# Patient Record
Sex: FEMALE | Race: WHITE | NOT HISPANIC OR LATINO | Employment: OTHER | URBAN - METROPOLITAN AREA
[De-identification: names, ages, dates, MRNs, and addresses within clinical notes are randomized per-mention and may not be internally consistent; named-entity substitution may affect disease eponyms.]

---

## 2018-02-05 ENCOUNTER — OFFICE VISIT (OUTPATIENT)
Dept: FAMILY MEDICINE | Facility: CLINIC | Age: 78
End: 2018-02-05
Attending: FAMILY MEDICINE
Payer: COMMERCIAL

## 2018-02-05 VITALS
DIASTOLIC BLOOD PRESSURE: 76 MMHG | SYSTOLIC BLOOD PRESSURE: 116 MMHG | HEIGHT: 61 IN | WEIGHT: 182 LBS | BODY MASS INDEX: 34.36 KG/M2 | HEART RATE: 85 BPM

## 2018-02-05 DIAGNOSIS — K30 PERSISTENT INDIGESTION: Primary | ICD-10-CM

## 2018-02-05 LAB
ALBUMIN SERPL-MCNC: 3.7 G/DL (ref 3.4–5)
ALP SERPL-CCNC: 69 U/L (ref 40–150)
ALT SERPL W P-5'-P-CCNC: 23 U/L (ref 0–50)
AMYLASE SERPL-CCNC: 31 U/L (ref 30–110)
AST SERPL W P-5'-P-CCNC: 17 U/L (ref 0–45)
BILIRUB DIRECT SERPL-MCNC: <0.1 MG/DL (ref 0–0.2)
BILIRUB SERPL-MCNC: 0.3 MG/DL (ref 0.2–1.3)
LIPASE SERPL-CCNC: 89 U/L (ref 73–393)
PROT SERPL-MCNC: 7.6 G/DL (ref 6.8–8.8)

## 2018-02-05 PROCEDURE — G0463 HOSPITAL OUTPT CLINIC VISIT: HCPCS | Mod: ZF

## 2018-02-05 PROCEDURE — 80076 HEPATIC FUNCTION PANEL: CPT | Performed by: FAMILY MEDICINE

## 2018-02-05 PROCEDURE — 36415 COLL VENOUS BLD VENIPUNCTURE: CPT | Performed by: FAMILY MEDICINE

## 2018-02-05 PROCEDURE — 83516 IMMUNOASSAY NONANTIBODY: CPT | Performed by: FAMILY MEDICINE

## 2018-02-05 PROCEDURE — 83690 ASSAY OF LIPASE: CPT | Performed by: FAMILY MEDICINE

## 2018-02-05 PROCEDURE — 82150 ASSAY OF AMYLASE: CPT | Performed by: FAMILY MEDICINE

## 2018-02-05 RX ORDER — OMEPRAZOLE 40 MG/1
40 CAPSULE, DELAYED RELEASE ORAL DAILY
Qty: 30 CAPSULE | Refills: 1 | Status: SHIPPED | OUTPATIENT
Start: 2018-02-05 | End: 2018-12-11

## 2018-02-05 ASSESSMENT — PAIN SCALES - GENERAL: PAINLEVEL: NO PAIN (0)

## 2018-02-05 NOTE — PATIENT INSTRUCTIONS
10 days of glutein free diet  Then 10 days of lactose free diet  Continue supplements that have probiotics  Last would try 2 wks of omeprazole   Return in 1 month  Blood work today

## 2018-02-05 NOTE — PROGRESS NOTES
[facility]  Established Patient visit    Name: Mirta Pino MRN: [id number]     Age: 77 year old           Chief Complaint:    YOB: 1940       CC: indigestion         HPI and ROS:   HPI:    77 year old female with past medical history of high cholesterol, multinodular goiter, reflux and arthritis who comes in today because of digestive problems.  For the last 2 weeks she has been getting sick after she eats.  This mainly was occurring in the evening, then she changed her main meal to midday and she still gets this feeling of sickness.  Comes about an hour after she has eaten.  She feels somewhat nauseous but does not vomit.  She also has lost her appetite.  Her stools have not changed they are regular, no blood no diarrhea.  She has lost  about 5 pounds.  She does have reflux and  has been prescribed ranitidine however she never took it.  She uses self treatment of a tablespoon of apple cider vinegar and water and this seems to settle her stomach. She does report heartburn.   She takes multiple supplements which are vitamins and includes probiotics.  She also describes no energy.  She has not tried a gluten-free diet nor lactose-free diet. She likes milk and cheese and eats considerable amounts.  She reports minimal alcohol intake with 5 drinks a month.  She does not take NSAIDs.  She is not a smoker.    ROS:  A 14-point Review of Systems was performed and is negative other than mentioned in HPI         Past Medical History:     Past Medical History:   Diagnosis Date     Colon polyps      NO ACTIVE PROBLEMS (aka NONE)      Pneumonia spring 2012     Reflux      Shingles     1 episode     Thyroid disease      Thyroid nodule     had biopsies  many yrs ago and benign - no meds             Past Surgical History:      Past Surgical History:   Procedure Laterality Date     APPENDECTOMY  1962    had jc on fallopian tube and was removed also     ARTHROPLASTY MINIMALLY INVASIVE HIP   2/27/2013    Procedure: ARTHROPLASTY MINIMALLY INVASIVE HIP;  Minimally Invasive Left Two Incision Total  Hip Arthroplasty;  Surgeon: Brennan Covarrubias MD;  Location: [facility]     C NONSPECIFIC PROCEDURE      ovarian cyst age 22     C NONSPECIFIC PROCEDURE      lipoma back 1992     CHOLECYSTECTOMY       CHOLECYSTECTOMY  2009     COLONOSCOPY  , 2008, 11/4/2013    due in 2018 Hx of precancerous polyps, +FHx maternal aunt     HRW TOTAL KNEE REPLACEMT, CRNA Right 7/9/2015     KNEE SURGERY  2003    right meniscus     SOFT TISSUE SURGERY       SOFT TISSUE SURGERY  1982    tumor on back - benign               Immunizations:     Immunization History   Administered Date(s) Administered     Pneumococcal 23 valent 06/05/2012     TDAP Vaccine (Boostrix) 06/05/2012     Zoster vaccine, live 06/05/2012             Allergies:     Allergies   Allergen Reactions     Gluten      Ibuprofen Sodium GI Disturbance     [facility]-Ginseng [Harmonex] Hives             Medications:     Current Outpatient Prescriptions on File Prior to Visit:  ALFALFA PO Take 10 mg by mouth Calcium 300 mg   Nutritional Supplements (NUTRITIONAL SUPPLEMENT PO) Take 4 capsules by mouth daily OsteomatrixCalcium carbonate, citrate, malate 1,000 mgVitamin D3 600 iu   Nutritional Supplements (NUTRITIONAL SUPPLEMENT PO) Take 2 tablets by mouth Cholesterol Reduction Complex   Nutritional Supplements (NUTRITIONAL SUPPLEMENT PO) Take 1 tablet by mouth daily Mindworks   Cholecalciferol (VITAMIN D) 1000 UNITS capsule Take 2 capsules by mouth daily   Nutritional Supplements (NUTRITIONAL SUPPLEMENT PO) Take 2 capsules by mouth Omegaguard   Nutritional Supplements (NUTRITIONAL SUPPLEMENT PO) Take 2 tablets by mouth daily NutriferonImmune system science   Nutritional Supplements (NUTRITIONAL SUPPLEMENT PO) Slow aging at cellular levelLiquid supplement   aspirin 81 MG tablet Take 1 tablet (81 mg) by mouth daily   Multiple Vitamin (MULTI-VITAMIN DAILY PO) Take 6 tablets by  "mouth daily. Prepackaged vitamin supplement    albuterol (PROAIR HFA, PROVENTIL HFA, VENTOLIN HFA) 108 (90 BASE) MCG/ACT inhaler Inhale 2 puffs into the lungs every 6 hours as needed for shortness of breath / dyspnea or wheezing     No current facility-administered medications on file prior to visit.          Exam:   /76  Pulse 85  Ht 1.549 m (5' 1\")  Wt 82.6 kg (182 lb)  BMI 34.39 kg/m2    General: NAD, alert and conversational  CV: Normal S1,S2 with RRR no murmurs, rubs or gallops  Resp: Lungs CTA bilaterally with no wheezes or crackles  Abd: hyperactive bowel sounds, no distension, Soft, NTND with no organomegaly or masses    Skin: No concerning lesions or rashes        Labs:   TTG  LFT's  Amylase, lipase          Assessment and Plan:     Assessment.  This is a 77-year-old woman who comes in with a two-week history of nausea after eating and loss of appetite.  Plan  1.  Nausea/loss of appetite.  This could be related to acid indigestion, she does have a hx of reflux.  Could be gluten intolerance.  Could be lactose intolerance.  Will check blood work for gluten intolerance, also for liver or pancreatic abnormalities.  We talked about starting with a gluten-free diet for 10 days if this does not help then go to a lactose-free diet for 10 days, if that does not help and I gave her a prescription for omeprazole 40 mg 1 daily for 2 weeks.  I wanted her to stop the supplements except for the probiotics but she was unwilling to do this so she will continue with her supplements and probiotics.  I will see her back in 1 month. Consider H pylori testing.             RTC: 1 month  Margie Haddad MD, PhD                 "

## 2018-02-05 NOTE — LETTER
2/5/2018       RE: Mirta Pino  [address] [facility] [address]     Dear Colleague,    Thank you for referring your patient, Mirta Pino, to the [facility] at [facility]. Please see a copy of my visit note below.              [facility] Center  Established Patient visit    Name: Mirta Pino MRN: [id number]     Age: 77 year old           Chief Complaint:    YOB: 1940       CC: indigestion         HPI and ROS:   HPI:    77 year old female with past medical history of high cholesterol, multinodular goiter, reflux and arthritis who comes in today because of digestive problems.  For the last 2 weeks she has been getting sick after she eats.  This mainly was occurring in the evening, then she changed her main meal to midday and she still gets this feeling of sickness.  Comes about an hour after she has eaten.  She feels somewhat nauseous but does not vomit.  She also has lost her appetite.  Her stools have not changed they are regular, no blood no diarrhea.  She has lost  about 5 pounds.  She does have reflux and  has been prescribed ranitidine however she never took it.  She uses self treatment of a tablespoon of apple cider vinegar and water and this seems to settle her stomach. She does report heartburn.   She takes multiple supplements which are vitamins and includes probiotics.  She also describes no energy.  She has not tried a gluten-free diet nor lactose-free diet. She likes milk and cheese and eats considerable amounts.  She reports minimal alcohol intake with 5 drinks a month.  She does not take NSAIDs.  She is not a smoker.    ROS:  A 14-point Review of Systems was performed and is negative other than mentioned in HPI         Past Medical History:     Past Medical History:   Diagnosis Date     Colon polyps      NO ACTIVE PROBLEMS (aka NONE)      Pneumonia spring 2012     Reflux      Shingles     1 episode     Thyroid  disease      Thyroid nodule     had biopsies  many yrs ago and benign - no meds             Past Surgical History:      Past Surgical History:   Procedure Laterality Date     APPENDECTOMY  1962    had jc on fallopian tube and was removed also     ARTHROPLASTY MINIMALLY INVASIVE HIP  2/27/2013    Procedure: ARTHROPLASTY MINIMALLY INVASIVE HIP;  Minimally Invasive Left Two Incision Total  Hip Arthroplasty;  Surgeon: Brennan Covarrubias MD;  Location: [facility]     C NONSPECIFIC PROCEDURE      ovarian cyst age 22     C NONSPECIFIC PROCEDURE      lipoma back 1992     CHOLECYSTECTOMY       CHOLECYSTECTOMY  2009     COLONOSCOPY  , 2008, 11/4/2013    due in 2018 Hx of precancerous polyps, +FHx maternal aunt     HRW TOTAL KNEE REPLACEMT, CRNA Right 7/9/2015     KNEE SURGERY  2003    right meniscus     SOFT TISSUE SURGERY       SOFT TISSUE SURGERY  1982    tumor on back - benign               Immunizations:     Immunization History   Administered Date(s) Administered     Pneumococcal 23 valent 06/05/2012     TDAP Vaccine (Boostrix) 06/05/2012     Zoster vaccine, live 06/05/2012             Allergies:     Allergies   Allergen Reactions     Gluten      Ibuprofen Sodium GI Disturbance     [facility]-Ginseng [Harmonex] Hives             Medications:     Current Outpatient Prescriptions on File Prior to Visit:  ALFALFA PO Take 10 mg by mouth Calcium 300 mg   Nutritional Supplements (NUTRITIONAL SUPPLEMENT PO) Take 4 capsules by mouth daily OsteomatrixCalcium carbonate, citrate, malate 1,000 mgVitamin D3 600 iu   Nutritional Supplements (NUTRITIONAL SUPPLEMENT PO) Take 2 tablets by mouth Cholesterol Reduction Complex   Nutritional Supplements (NUTRITIONAL SUPPLEMENT PO) Take 1 tablet by mouth daily Mindworks   Cholecalciferol (VITAMIN D) 1000 UNITS capsule Take 2 capsules by mouth daily   Nutritional Supplements (NUTRITIONAL SUPPLEMENT PO) Take 2 capsules by mouth Omegaguard   Nutritional Supplements (NUTRITIONAL SUPPLEMENT PO)  "Take 2 tablets by mouth daily NutriferonImmune system science   Nutritional Supplements (NUTRITIONAL SUPPLEMENT PO) Slow aging at cellular levelLiquid supplement   aspirin 81 MG tablet Take 1 tablet (81 mg) by mouth daily   Multiple Vitamin (MULTI-VITAMIN DAILY PO) Take 6 tablets by mouth daily. Prepackaged vitamin supplement    albuterol (PROAIR HFA, PROVENTIL HFA, VENTOLIN HFA) 108 (90 BASE) MCG/ACT inhaler Inhale 2 puffs into the lungs every 6 hours as needed for shortness of breath / dyspnea or wheezing     No current facility-administered medications on file prior to visit.          Exam:   /77  Pulse 85  Ht 1.549 m (5' 1\")  Wt 82.6 kg (182 lb)  BMI 34.39 kg/m2    General: NAD, alert and conversational  CV: Normal S1,S2 with RRR no murmurs, rubs or gallops  Resp: Lungs CTA bilaterally with no wheezes or crackles  Abd: hyperactive bowel sounds, no distension, Soft, NTND with no organomegaly or masses    Skin: No concerning lesions or rashes        Labs:   TTG  LFT's  Amylase, lipase          Assessment and Plan:     Assessment.  This is a 77-year-old woman who comes in with a two-week history of nausea after eating and loss of appetite.  Plan  1.  Nausea/loss of appetite.  This could be related to acid indigestion, she does have a hx of reflux.  Could be gluten intolerance.  Could be lactose intolerance.  Will check blood work for gluten intolerance, also for liver or pancreatic abnormalities.  We talked about starting with a gluten-free diet for 10 days if this does not help then go to a lactose-free diet for 10 days, if that does not help and I gave her a prescription for omeprazole 40 mg 1 daily for 2 weeks.  I wanted her to stop the supplements except for the probiotics but she was unwilling to do this so she will continue with her supplements and probiotics.  I will see her back in 1 month. Consider H pylori testing.             RTC: 1 month  Margie Haddad MD, PhD                   Again, thank " you for allowing me to participate in the care of your patient.      Sincerely,    Margie Haddad MD PhD

## 2018-02-05 NOTE — LETTER
2/6/2018         Mirta Pino   [address] [facility] [address]        Dear Ms. Pino:    The results of your recent test(s) listed below were normal. I would still try the gluten free diet as well as the other things we talked about    Results for orders placed or performed in visit on 02/05/18   Lipase   Result Value Ref Range    Lipase 89 73 - 393 U/L   Tissue transglutaminase braden IgA and IgG   Result Value Ref Range    Tissue Transglutaminase Antibody IgA <1 <7 U/mL    Tissue Transglutaminase Braden IgG <1 <7 U/mL   Amylase   Result Value Ref Range    Amylase 31 30 - 110 U/L   Hepatic Panel   Result Value Ref Range    Bilirubin Direct <0.1 0.0 - 0.2 mg/dL    Bilirubin Total 0.3 0.2 - 1.3 mg/dL    Albumin 3.7 3.4 - 5.0 g/dL    Protein Total 7.6 6.8 - 8.8 g/dL    Alkaline Phosphatase 69 40 - 150 U/L    ALT 23 0 - 50 U/L    AST 17 0 - 45 U/L         Please note that test explanations are brief and do not reflect all diagnostic uses.  If you have any questions or concerns, please call the clinic at [phone].      Sincerely,      Margie Haddad MD, PhD

## 2018-02-05 NOTE — NURSING NOTE
Chief Complaint   Patient presents with     Gastrointestinal Problem     Pt comes to clinic today to discuss digestive concerns.     B/P average reading 113/75

## 2018-02-05 NOTE — MR AVS SNAPSHOT
After Visit Summary   2/5/2018    Mirta Pino    MRN: [id number]           Patient Information     Date Of Birth          1940        Visit Information        Provider Department      2/5/2018 3:00 PM Margie Haddad MD PhD [facility]        Today's Diagnoses     Persistent indigestion    -  1      Care Instructions    10 days of glutein free diet  Then 10 days of lactose free diet  Continue supplements that have probiotics  Last would try 2 wks of omeprazole   Return in 1 month  Blood work today           Follow-ups after your visit        Future tests that were ordered for you today     Open Future Orders        Priority Expected Expires Ordered    Tissue transglutaminase braden IgA and IgG Routine  2/5/2019 2/5/2018    Amylase Routine  2/5/2019 2/5/2018    Hepatic Panel Routine  2/5/2019 2/5/2018            Who to contact     Please call your clinic at [phone] to:    Ask questions about your health    Make or cancel appointments    Discuss your medicines    Learn about your test results    Speak to your doctor   If you have compliments or concerns about an experience at your clinic, or if you wish to file a complaint, please contact [facility] Patient Relations at [phone] or email us at [email or web address]         Additional Information About Your Visit        MyChart Information     MediaLifTVt is an electronic gateway that provides easy, online access to your medical records. With 365webcall, you can request a clinic appointment, read your test results, renew a prescription or communicate with your care team.     To sign up for MediaLifTVt visit the website at [email or web address]   You will be asked to enter the access code listed below, as well as some personal information. Please follow the directions to create your username and password.     Your access code is: [email or web address]  Expires: 5/6/2018  4:04 PM     Your  "access code will  in 90 days. If you need help or a new code, please contact your [facility] or call [phone] for assistance.        Care EveryWhere ID     This is your Care EveryWhere ID. This could be used by other organizations to access your [place] medical records  [id number]        Your Vitals Were     Pulse Height BMI (Body Mass Index)             85 1.549 m (5' 1\") 34.39 kg/m2          Blood Pressure from Last 3 Encounters:   18 116/76   16 131/77   16 124/83    Weight from Last 3 Encounters:   18 82.6 kg (182 lb)   16 82.2 kg (181 lb 4.8 oz)   16 82.8 kg (182 lb 8 oz)              We Performed the Following     Lipase          Today's Medication Changes          These changes are accurate as of 18  4:04 PM.  If you have any questions, ask your nurse or doctor.               Start taking these medicines.        Dose/Directions    omeprazole 40 MG capsule   Commonly known as:  priLOSEC   Used for:  Persistent indigestion   Started by:  Margie Haddad MD PhD        Dose:  40 mg   Take 1 capsule (40 mg) by mouth daily Take 30-60 minutes before a meal.   Quantity:  30 capsule   Refills:  1            Where to get your medicines      Some of these will need a paper prescription and others can be bought over the counter.  Ask your nurse if you have questions.     Bring a paper prescription for each of these medications     omeprazole 40 MG capsule                Primary Care Provider Office Phone # Fax #    Margie Haddad MD PhD [phone]       [address] [place], [place] [id number]        Equal Access to Services     SEMAJ VILLAFANA AH: Hadii ashlegih Villasenor, waaxda luqadaha, qaybta kaalmada yuly palm. So [facility] [phone].    ATENCIÓN: Si habla español, tiene a triana disposición servicios gratuitos de asistencia lingüística. Llame al [phone].    We " comply with applicable federal civil rights laws and [place] laws. We do not discriminate on the basis of race, color, national origin, age, disability, sex, sexual orientation, or gender identity.            Thank you!     Thank you for choosing [facility]  for your care. Our goal is always to provide you with excellent care. Hearing back from our patients is one way we can continue to improve our services. Please take a few minutes to complete the written survey that you may receive in the mail after your visit with us. Thank you!             Your Updated Medication List - Protect others around you: Learn how to safely use, store and throw away your medicines at www.disposemymeds.org.          This list is accurate as of 2/5/18  4:04 PM.  Always use your most recent med list.                   Brand Name Dispense Instructions for use Diagnosis    albuterol 108 (90 BASE) MCG/ACT Inhaler    PROAIR HFA/PROVENTIL HFA/VENTOLIN HFA    1 Inhaler    Inhale 2 puffs into the lungs every 6 hours as needed for shortness of breath / dyspnea or wheezing    Mild persistent asthma without complication       ALFALFA PO      Take 10 mg by mouth Calcium 300 mg        aspirin 81 MG tablet     30 tablet    Take 1 tablet (81 mg) by mouth daily    Hypercholesterolemia       MULTI-VITAMIN DAILY PO      Take 6 tablets by mouth daily. Prepackaged vitamin supplement        * NUTRITIONAL SUPPLEMENT PO      Take 4 capsules by mouth daily Osteomatrix Calcium carbonate, citrate, malate 1,000 mg Vitamin D3 600 iu        * NUTRITIONAL SUPPLEMENT PO      Take 2 tablets by mouth Cholesterol Reduction Complex        * NUTRITIONAL SUPPLEMENT PO      Take 1 tablet by mouth daily Mindworks        * NUTRITIONAL SUPPLEMENT PO      Take 2 capsules by mouth Omegaguard        * NUTRITIONAL SUPPLEMENT PO      Take 2 tablets by mouth daily Nutriferon Immune system science        * NUTRITIONAL SUPPLEMENT PO      Slow aging at cellular  level Liquid supplement        omeprazole 40 MG capsule    priLOSEC    30 capsule    Take 1 capsule (40 mg) by mouth daily Take 30-60 minutes before a meal.    Persistent indigestion       vitamin D 1000 UNITS capsule      Take 2 capsules by mouth daily        * Notice:  This list has 6 medication(s) that are the same as other medications prescribed for you. Read the directions carefully, and ask your doctor or other care provider to review them with you.

## 2018-02-05 NOTE — LETTER
Date:February 6, 2018      Patient was self referred, no letter generated. Do not send.        [facility] Health Information

## 2018-02-06 LAB
TTG IGA SER-ACNC: <1 U/ML
TTG IGG SER-ACNC: <1 U/ML

## 2018-10-06 ENCOUNTER — HEALTH MAINTENANCE LETTER (OUTPATIENT)
Age: 78
End: 2018-10-06

## 2018-12-11 ENCOUNTER — TELEPHONE (OUTPATIENT)
Dept: GASTROENTEROLOGY | Facility: CLINIC | Age: 78
End: 2018-12-11

## 2018-12-11 ENCOUNTER — OFFICE VISIT (OUTPATIENT)
Dept: FAMILY MEDICINE | Facility: CLINIC | Age: 78
End: 2018-12-11
Attending: FAMILY MEDICINE
Payer: COMMERCIAL

## 2018-12-11 VITALS
WEIGHT: 186 LBS | SYSTOLIC BLOOD PRESSURE: 136 MMHG | BODY MASS INDEX: 35.12 KG/M2 | HEIGHT: 61 IN | DIASTOLIC BLOOD PRESSURE: 83 MMHG

## 2018-12-11 DIAGNOSIS — M85.80 OSTEOPENIA, UNSPECIFIED LOCATION: ICD-10-CM

## 2018-12-11 DIAGNOSIS — Z76.89 ENCOUNTER TO ESTABLISH CARE: Primary | ICD-10-CM

## 2018-12-11 DIAGNOSIS — R03.0 ELEVATED BP WITHOUT DIAGNOSIS OF HYPERTENSION: ICD-10-CM

## 2018-12-11 DIAGNOSIS — Z12.11 COLON CANCER SCREENING: ICD-10-CM

## 2018-12-11 DIAGNOSIS — M81.0 AGE-RELATED OSTEOPOROSIS WITHOUT CURRENT PATHOLOGICAL FRACTURE: ICD-10-CM

## 2018-12-11 PROCEDURE — G0463 HOSPITAL OUTPT CLINIC VISIT: HCPCS | Mod: ZF

## 2018-12-11 ASSESSMENT — MIFFLIN-ST. JEOR: SCORE: 1266.07

## 2018-12-11 ASSESSMENT — PAIN SCALES - GENERAL: PAINLEVEL: NO PAIN (0)

## 2018-12-11 NOTE — LETTER
2018       RE: Mirta Pino  [address] [facility] [address]     Dear Colleague,    Thank you for referring your patient, Mirta Pino, to the [facility] at [facility]. Please see a copy of my visit note below.    Mirta is a 78 year old female  that presents today to establish care and have a BP check   HPI:  - Has previously seen Dr. Haddad.  Not seen anMD for > 2 years.   - Running agility with her dog for exercise  1) Worried about BP because three days in a row her face was flushed. Took BP at [facility] and BP was 144'63    2) Has a spot on the inside of her leg that is worrisome and new X a couple of months.   3) Needs update with colonoscopy and DEXA  BP Readings from Last 3 Encounters:   18 136/83   18 116/76   16 131/77   ROS:  General:gained weight.  Eats too many pastries.   Head/Eyes: none  Ears/Nose/Throat: none  Cardiovascular: none  Respiratory: none  Gastrointestinal: none  Breast: none  Genitourinary: none  Sexual Function: none  Musculoskeletal: none  Skin: new lesion  Neurological: none  Mental Health: none  Endocrine: none  PROBLEM LIST:  Patient Active Problem List   Diagnosis     Colon polyps     Hypercholesterolemia     OA (osteoarthritis) of knee     Non-toxic multinodular goiter     Left knee pain   OB/GYN HISTORY:   Obstetric History     The patient has never been pregnant.      PAST MEDICAL HISTORY:  Past Medical History:   Diagnosis Date     Colon polyps      NO ACTIVE PROBLEMS (aka NONE)      Pneumonia spring 2012     Reflux      Shingles     1 episode     Thyroid disease      Thyroid nodule     had biopsies  many yrs ago and benign - no meds   Life Style Modifiers:   Tobacco:  reports that she quit smoking about 37 years ago. Her smoking use included cigarettes. She has a 10.00 pack-year smoking history. she has never used smokeless tobacco.   Alcohol:  reports that she drinks about 1.2 oz  of alcohol per week.   Drug use:  reports that she does not use drugs.              PAST SURGICAL HISTORY:  Past Surgical History:   Procedure Laterality Date     APPENDECTOMY      had jc on fallopian tube and was removed also     ARTHROPLASTY MINIMALLY INVASIVE HIP  2013    Procedure: ARTHROPLASTY MINIMALLY INVASIVE HIP;  Minimally Invasive Left Two Incision Total  Hip Arthroplasty;  Surgeon: Brennan Covarrubias MD;  Location: [facility] OR     C NONSPECIFIC PROCEDURE      ovarian cyst age 22     C NONSPECIFIC PROCEDURE      lipoma back      CHOLECYSTECTOMY       CHOLECYSTECTOMY  2009     COLONOSCOPY  , , 2013    due in 2018 Hx of precancerous polyps, +FHx maternal aunt     HRW TOTAL KNEE REPLACEMT, CRNA Right 2015     KNEE SURGERY  2003    right meniscus     SOFT TISSUE SURGERY       SOFT TISSUE SURGERY  1982    tumor on back - benign   FAMILY HISTORY:  Family History   Problem Relation Age of Onset     Hypertension Mother      Alzheimer Disease Mother      Heart Disease Father      Lipids Father      Cancer - colorectal Maternal Aunt      Cancer Maternal Aunt         malignant melanoma     SOCIAL HISTORY:  Social History     Socioeconomic History     Marital status: Single     Spouse name: Not on file     Number of children: Not on file     Years of education: Not on file     Highest education level: Not on file   Social Needs     Financial resource strain: Not on file     Food insecurity - worry: Not on file     Food insecurity - inability: Not on file     Transportation needs - medical: Not on file     Transportation needs - non-medical: Not on file   Occupational History     Not on file   Tobacco Use     Smoking status: Former Smoker     Packs/day: 1.00     Years: 10.00     Pack years: 10.00     Types: Cigarettes     Last attempt to quit: 1981     Years since quittin.9     Smokeless tobacco: Never Used     Tobacco comment:    Substance and Sexual Activity     Alcohol use: Yes      Alcohol/week: 1.2 oz     Types: 2 Standard drinks or equivalent per week     Comment:  1/2 glass wine daily     Drug use: No     Sexual activity: Not Currently   Other Topics Concern      Service Not Asked     Blood Transfusions No     Caffeine Concern Yes     Comment: starbucks mocha 1/day     Occupational Exposure Yes     Hobby Hazards No     Sleep Concern No     Stress Concern No     Weight Concern No     Special Diet No     Back Care No     Exercise Yes     Bike Helmet No     Seat Belt Yes     Self-Exams Yes     Comment: occassionally     Parent/sibling w/ CABG, MI or angioplasty before 65F 55M? Not Asked   Social History Narrative    Does showing of dogs.  Never . Lives in a house.  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 Prepackaged vitamin supplement        Nutritional Supplements (NUTRITIONAL SUPPLEMENT PO) Take 4 capsules by mouth daily Osteomatrix  Calcium carbonate, citrate, malate 1,000 mg  Vitamin D3 600 iu       Nutritional Supplements (NUTRITIONAL SUPPLEMENT PO) Take 2 tablets by mouth Cholesterol Reduction Complex       Nutritional Supplements (NUTRITIONAL SUPPLEMENT PO) Take 1 tablet by mouth daily Mindworks       Nutritional Supplements (NUTRITIONAL SUPPLEMENT PO) Take 2 capsules by mouth Omegaguard       Nutritional Supplements (NUTRITIONAL SUPPLEMENT PO) Take 2 tablets by mouth daily  "Nutriferon  Immune system science       Nutritional Supplements (NUTRITIONAL SUPPLEMENT PO) Slow aging at cellular level  Liquid supplement       omeprazole (PRILOSEC) 40 MG capsule Take 1 capsule (40 mg) by mouth daily Take 30-60 minutes before a meal. 30 capsule 1   ALLERGIES:  Gluten; Ibuprofen sodium; and St johns wort-ginseng [harmonex]  VITALS:  /83   Ht 1.549 m (5' 1\")   Wt 84.4 kg (186 lb)   BMI 35.14 kg/m     PHYSICAL EXAM:  Constitutional: Well appearing woman in no acute distress.   Psychological: appropriate mood.  Eyes: anicteric, normal extra-ocular movements  Musculoskeletal: full range of motion    Skin: cm oval shaped flat hypopigmented lesion on inner left thigh  Diagnoses and associated orders for this visit:  There are no diagnoses linked to this encounter.  Elevated BP readings:    - Will get a BP cuff    - Follow-up BP check in one month with nurse and collaborate with new cuff    - If number is consistently > 130-140 will start medication.  She agrees with the plan   HCM:  Osteopenia, unspecified location  -     Dexa Hip, Pelvis, Spine; Future  Colon cancer screening  -     GASTROENTEROLOGY ADULT REF PROCEDURE ONLY    Again, thank you for allowing me to participate in the care of your patient.      Sincerely,    Mattie Odonnell MD      "

## 2018-12-11 NOTE — PATIENT INSTRUCTIONS
Dermatology Referral:  Dermatology consultants: [phone]; [[facility], [facility]]      BP check at home    Follow-up in one month for nurse BP check and bring cuff to collaborate readings.       Colonoscopy:     [facility]: [phone] endoscopy      DEXA:   [facility] Scheduling ([phone])

## 2018-12-11 NOTE — PROGRESS NOTES
Mirta is a 78 year old female  that presents today to establish care and have a BP check   HPI:  - Has previously seen Dr. Haddad.  Not seen anMD for > 2 years.   - Running agility with her dog for exercise  1) Worried about BP because three days in a row her face was flushed. Took BP at [facility] and BP was 144'63    2) Has a spot on the inside of her leg that is worrisome and new X a couple of months.   3) Needs update with colonoscopy and DEXA  BP Readings from Last 3 Encounters:   18 136/83   18 116/76   16 131/77   ROS:  General:gained weight.  Eats too many pastries.   Head/Eyes: none  Ears/Nose/Throat: none  Cardiovascular: none  Respiratory: none  Gastrointestinal: none  Breast: none  Genitourinary: none  Sexual Function: none  Musculoskeletal: none  Skin: new lesion  Neurological: none  Mental Health: none  Endocrine: none  PROBLEM LIST:  Patient Active Problem List   Diagnosis     Colon polyps     Hypercholesterolemia     OA (osteoarthritis) of knee     Non-toxic multinodular goiter     Left knee pain   OB/GYN HISTORY:   Obstetric History     The patient has never been pregnant.      PAST MEDICAL HISTORY:  Past Medical History:   Diagnosis Date     Colon polyps      NO ACTIVE PROBLEMS (aka NONE)      Pneumonia spring 2012     Reflux      Shingles     1 episode     Thyroid disease      Thyroid nodule     had biopsies  many yrs ago and benign - no meds   Life Style Modifiers:   Tobacco:  reports that she quit smoking about 37 years ago. Her smoking use included cigarettes. She has a 10.00 pack-year smoking history. she has never used smokeless tobacco.   Alcohol:  reports that she drinks about 1.2 oz of alcohol per week.   Drug use:  reports that she does not use drugs.              PAST SURGICAL HISTORY:  Past Surgical History:   Procedure Laterality Date     APPENDECTOMY      had jc on fallopian tube and was removed also     ARTHROPLASTY MINIMALLY INVASIVE HIP  2013     Procedure: ARTHROPLASTY MINIMALLY INVASIVE HIP;  Minimally Invasive Left Two Incision Total  Hip Arthroplasty;  Surgeon: Brennan Covarrubias MD;  [facility]     C NONSPECIFIC PROCEDURE      ovarian cyst age 22     C NONSPECIFIC PROCEDURE      lipoma back      CHOLECYSTECTOMY       CHOLECYSTECTOMY       COLONOSCOPY  , , 2013    due in 2018 Hx of precancerous polyps, +FHx maternal aunt     HRW TOTAL KNEE REPLACEMT, CRNA Right 2015     KNEE SURGERY  2003    right meniscus     SOFT TISSUE SURGERY       SOFT TISSUE SURGERY  1982    tumor on back - benign   FAMILY HISTORY:  Family History   Problem Relation Age of Onset     Hypertension Mother      Alzheimer Disease Mother      Heart Disease Father      Lipids Father      Cancer - colorectal Maternal Aunt      Cancer Maternal Aunt         malignant melanoma     SOCIAL HISTORY:  Social History     Socioeconomic History     Marital status: Single     Spouse name: Not on file     Number of children: Not on file     Years of education: Not on file     Highest education level: Not on file   Social Needs     Financial resource strain: Not on file     Food insecurity - worry: Not on file     Food insecurity - inability: Not on file     Transportation needs - medical: Not on file     Transportation needs - non-medical: Not on file   Occupational History     Not on file   Tobacco Use     Smoking status: Former Smoker     Packs/day: 1.00     Years: 10.00     Pack years: 10.00     Types: Cigarettes     Last attempt to quit: 1981     Years since quittin.9     Smokeless tobacco: Never Used     Tobacco comment:    Substance and Sexual Activity     Alcohol use: Yes     Alcohol/week: 1.2 oz     Types: 2 Standard drinks or equivalent per week     Comment:  1/2 glass wine daily     Drug use: No     Sexual activity: Not Currently   Other Topics Concern      Service Not Asked     Blood Transfusions No     Caffeine Concern Yes     Comment:  starbucks mocha 1/day     Occupational Exposure Yes     Hobby Hazards No     Sleep Concern No     Stress Concern No     Weight Concern No     Special Diet No     Back Care No     Exercise Yes     Bike Helmet No     Seat Belt Yes     Self-Exams Yes     Comment: occassionally     Parent/sibling w/ CABG, MI or angioplasty before 65F 55M? Not Asked   Social History Narrative    Does showing of dogs.  Never . Lives in a house.  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 Prepackaged vitamin supplement        Nutritional Supplements (NUTRITIONAL SUPPLEMENT PO) Take 4 capsules by mouth daily Osteomatrix  Calcium carbonate, citrate, malate 1,000 mg  Vitamin D3 600 iu       Nutritional Supplements (NUTRITIONAL SUPPLEMENT PO) Take 2 tablets by mouth Cholesterol Reduction Complex       Nutritional Supplements (NUTRITIONAL SUPPLEMENT PO) Take 1 tablet by mouth daily Mindworks       Nutritional Supplements (NUTRITIONAL SUPPLEMENT PO) Take 2 capsules by mouth Omegaguard       Nutritional Supplements (NUTRITIONAL SUPPLEMENT PO) Take 2 tablets by mouth daily Nutriferon  Immune system science       Nutritional Supplements (NUTRITIONAL SUPPLEMENT PO) Slow aging at cellular level  Liquid supplement       omeprazole (PRILOSEC) 40 MG capsule Take 1 capsule (40 mg) by mouth daily Take 30-60 minutes before a meal. 30 capsule 1  "  ALLERGIES:  Gluten; Ibuprofen sodium; and St johns wort-ginseng [harmonex]  VITALS:  /83   Ht 1.549 m (5' 1\")   Wt 84.4 kg (186 lb)   BMI 35.14 kg/m    PHYSICAL EXAM:  Constitutional: Well appearing woman in no acute distress.   Psychological: appropriate mood.  Eyes: anicteric, normal extra-ocular movements  Musculoskeletal: full range of motion    Skin: cm oval shaped flat hypopigmented lesion on inner left thigh  Diagnoses and associated orders for this visit:  There are no diagnoses linked to this encounter.  Elevated BP readings:    - Will get a BP cuff    - Follow-up BP check in one month with nurse and collaborate with new cuff    - If number is consistently > 130-140 will start medication.  She agrees with the plan   HCM:  Osteopenia, unspecified location  -     Dexa Hip, Pelvis, Spine; Future  Colon cancer screening  -     GASTROENTEROLOGY ADULT REF PROCEDURE ONLY          "

## 2019-02-04 ENCOUNTER — TELEPHONE (OUTPATIENT)
Dept: GASTROENTEROLOGY | Facility: CLINIC | Age: 79
End: 2019-02-04

## 2019-02-04 NOTE — TELEPHONE ENCOUNTER
: [x] N/A   [] Yes:  Language /  ID:     VM with request pt contact Endoscopy Pre-assessment RN to review upcoming procedure information.  Telephone call-back number provided.    Iza Anton, RN  [facility]    Additional Information regarding appointment:      Patient scheduled for:  [] EGD  [x] Colonoscopy  [] EUS  [] Flex Sig   [] Other:     Indication for procedure. [x] Screening   []     Procedure Provider:  Don      Referring Provider. Blas    Arrival time verified: Mon; 2/11/19; 0715    Facility location verified:   [][address], [address] Floor, [address]  [x][address] [facility], 5th floor       Prep Type:   [x]Golytely eRx: (not sent);  [] MoviPrep: , [] MiraLax: , [] Other:   []NPO /p MN, No solid food /p 2200 the night before    Anticoagulants or blood thinners: []None [x] ASA 81mg [] Warfarin  [] Warfarin + Lovenox bridge [] Plavix [] Effient [] Eliquis  [] Xarelto  [] Brilinta [] NSAIDS  [] Other    LAST anticoagulant dose: Date/Time: May continue ASA 81mg  INR: N/A    Electronic implanted devices: [x] No  [] IPG  []  ICD  []  LVAD  []     H&P / Pre op physical completed: [x] N/A, [] Complete, Date , [] Scheduled, Date , [] No,     Additional Information:     _______________________________________________

## 2019-02-07 ENCOUNTER — TELEPHONE (OUTPATIENT)
Dept: GASTROENTEROLOGY | Facility: CLINIC | Age: 79
End: 2019-02-07

## 2019-02-07 DIAGNOSIS — Z12.11 SPECIAL SCREENING FOR MALIGNANT NEOPLASMS, COLON: Primary | ICD-10-CM

## 2019-02-07 NOTE — TELEPHONE ENCOUNTER
Patient scheduled for colonoscopy    Indication for procedure. 5 year follow up    Referring Provider. Mattie Odonnell    ? no    Arrival time verified? 7:15 am    Facility location verified? [address]; 5th floor    Instructions given regarding prep and procedure    Prep Type Golytely sent to [facility] in [place], [place]    Are you taking any anticoagulants or blood thinners? no    Instructions given? yes    Electronic implanted devices? no    Pre procedure teaching completed? Yes    Transportation from procedure? yes    H&P / Pre op physical completed? n/a

## 2019-02-11 ENCOUNTER — HOSPITAL ENCOUNTER (OUTPATIENT)
Facility: AMBULATORY SURGERY CENTER | Age: 79
End: 2019-02-11
Attending: INTERNAL MEDICINE
Payer: COMMERCIAL

## 2019-02-11 VITALS
DIASTOLIC BLOOD PRESSURE: 88 MMHG | TEMPERATURE: 97.9 F | HEART RATE: 62 BPM | SYSTOLIC BLOOD PRESSURE: 154 MMHG | RESPIRATION RATE: 12 BRPM | OXYGEN SATURATION: 98 %

## 2019-02-11 LAB — COLONOSCOPY: NORMAL

## 2019-02-11 RX ORDER — FLUMAZENIL 0.1 MG/ML
0.2 INJECTION, SOLUTION INTRAVENOUS
Status: ACTIVE | OUTPATIENT
Start: 2019-02-11 | End: 2019-02-11

## 2019-02-11 RX ORDER — LIDOCAINE 40 MG/G
CREAM TOPICAL
Status: DISCONTINUED | OUTPATIENT
Start: 2019-02-11 | End: 2019-02-11 | Stop reason: HOSPADM

## 2019-02-11 RX ORDER — SIMETHICONE
LIQUID (ML) MISCELLANEOUS PRN
Status: DISCONTINUED | OUTPATIENT
Start: 2019-02-11 | End: 2019-02-11 | Stop reason: HOSPADM

## 2019-02-11 RX ORDER — FENTANYL CITRATE 50 UG/ML
INJECTION, SOLUTION INTRAMUSCULAR; INTRAVENOUS PRN
Status: DISCONTINUED | OUTPATIENT
Start: 2019-02-11 | End: 2019-02-11 | Stop reason: HOSPADM

## 2019-02-11 RX ORDER — ONDANSETRON 4 MG/1
4 TABLET, ORALLY DISINTEGRATING ORAL EVERY 6 HOURS PRN
Status: DISCONTINUED | OUTPATIENT
Start: 2019-02-11 | End: 2019-02-12 | Stop reason: HOSPADM

## 2019-02-11 RX ORDER — ONDANSETRON 2 MG/ML
4 INJECTION INTRAMUSCULAR; INTRAVENOUS EVERY 6 HOURS PRN
Status: DISCONTINUED | OUTPATIENT
Start: 2019-02-11 | End: 2019-02-12 | Stop reason: HOSPADM

## 2019-02-11 RX ORDER — NALOXONE HYDROCHLORIDE 0.4 MG/ML
.1-.4 INJECTION, SOLUTION INTRAMUSCULAR; INTRAVENOUS; SUBCUTANEOUS
Status: DISCONTINUED | OUTPATIENT
Start: 2019-02-11 | End: 2019-02-12 | Stop reason: HOSPADM

## 2019-02-11 RX ORDER — ONDANSETRON 2 MG/ML
4 INJECTION INTRAMUSCULAR; INTRAVENOUS
Status: DISCONTINUED | OUTPATIENT
Start: 2019-02-11 | End: 2019-02-11 | Stop reason: HOSPADM

## 2019-02-12 LAB — COPATH REPORT: NORMAL

## 2021-02-03 ENCOUNTER — TELEPHONE (OUTPATIENT)
Dept: OBGYN | Facility: CLINIC | Age: 81
End: 2021-02-03

## 2021-02-03 NOTE — TELEPHONE ENCOUNTER
----- Message from Nguyen Mari RN sent at 2/3/2021 11:15 AM CST -----  Regarding: Covid vaccine questions  Patient is wondering how she can get scheduled for Covid vaccine

## 2021-02-03 NOTE — TELEPHONE ENCOUNTER
Called Shad and discussed options of scheduling through my chart or by calling [phone].  She will check back with those options.

## 2021-04-16 ENCOUNTER — OFFICE VISIT (OUTPATIENT)
Dept: OBGYN | Facility: CLINIC | Age: 81
End: 2021-04-16
Attending: NURSE PRACTITIONER
Payer: COMMERCIAL

## 2021-04-16 VITALS
HEART RATE: 60 BPM | DIASTOLIC BLOOD PRESSURE: 80 MMHG | SYSTOLIC BLOOD PRESSURE: 144 MMHG | BODY MASS INDEX: 33.82 KG/M2 | WEIGHT: 179 LBS

## 2021-04-16 DIAGNOSIS — Z00.00 ENCOUNTER FOR PREVENTIVE CARE: Primary | ICD-10-CM

## 2021-04-16 DIAGNOSIS — R42 DIZZINESS: ICD-10-CM

## 2021-04-16 DIAGNOSIS — M81.0 OSTEOPOROSIS WITHOUT CURRENT PATHOLOGICAL FRACTURE, UNSPECIFIED OSTEOPOROSIS TYPE: ICD-10-CM

## 2021-04-16 DIAGNOSIS — Z13.1 SCREENING FOR DIABETES MELLITUS: ICD-10-CM

## 2021-04-16 DIAGNOSIS — Z13.220 SCREENING FOR LIPID DISORDERS: ICD-10-CM

## 2021-04-16 DIAGNOSIS — Z13.29 SCREENING FOR THYROID DISORDER: ICD-10-CM

## 2021-04-16 DIAGNOSIS — Z78.0 POST-MENOPAUSAL: ICD-10-CM

## 2021-04-16 PROCEDURE — 99387 INIT PM E/M NEW PAT 65+ YRS: CPT | Performed by: NURSE PRACTITIONER

## 2021-04-16 SDOH — HEALTH STABILITY: MENTAL HEALTH: HOW MANY STANDARD DRINKS CONTAINING ALCOHOL DO YOU HAVE ON A TYPICAL DAY?: 1 OR 2

## 2021-04-16 SDOH — HEALTH STABILITY: MENTAL HEALTH: HOW OFTEN DO YOU HAVE A DRINK CONTAINING ALCOHOL?: 2-4 TIMES A MONTH

## 2021-04-16 SDOH — HEALTH STABILITY: MENTAL HEALTH: HOW OFTEN DO YOU HAVE 6 OR MORE DRINKS ON ONE OCCASION?: NEVER

## 2021-04-16 ASSESSMENT — PAIN SCALES - GENERAL: PAINLEVEL: NO PAIN (0)
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
## 2021-04-16 NOTE — LETTER
Date:April 21, 2021      Patient was self referred, no letter generated. Do not send.        [facility] Health Information
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
## 2021-05-25 ENCOUNTER — TELEPHONE (OUTPATIENT)
Dept: OBGYN | Facility: CLINIC | Age: 81
End: 2021-05-25

## 2021-05-25 NOTE — TELEPHONE ENCOUNTER
Message received from patient regarding questions about an immunization at her visit in April. Pt saw S. Evans NP 4/16/21. Shingles vaccine was discussed, however, deferred.    Called and spoke with patient. She states she is getting billed for shingles vaccine but doesn't think she received it.     Verified that it was not given at that visit, last shingles vaccine per her immunization record is from 2012. Pt states she will reach out to her insurance company to clarify.     Pt also requested [employer] activation code to be able to message S. Evans her BP readings which she reports to all be around 120s/70s.     Activation code sent. Encouraged patient to reach with any questions or concerns. She verbalized understanding and agreement.

## 2021-09-05 ENCOUNTER — HEALTH MAINTENANCE LETTER (OUTPATIENT)
Age: 81
End: 2021-09-05

## 2021-12-10 ENCOUNTER — TELEPHONE (OUTPATIENT)
Dept: OBGYN | Facility: CLINIC | Age: 81
End: 2021-12-10
Payer: COMMERCIAL

## 2021-12-10 DIAGNOSIS — H25.89 OTHER AGE-RELATED CATARACT, UNSPECIFIED LATERALITY: Primary | ICD-10-CM

## 2021-12-10 NOTE — TELEPHONE ENCOUNTER
[facility] Call Center    Phone Message    May a detailed message be left on voicemail: yes     Reason for Call: Other: Shad would like a referral for an eye doctor within [facility], as she was recently diagnosed with cataracts. Please call pt to discuss. Thanks     Action Taken: Other: whs    Travel Screening: Not Applicable

## 2021-12-13 ENCOUNTER — MYC MEDICAL ADVICE (OUTPATIENT)
Dept: OBGYN | Facility: CLINIC | Age: 81
End: 2021-12-13
Payer: COMMERCIAL

## 2022-01-27 NOTE — PROGRESS NOTES
[facility]OM[facility] PROFESSIONAL BLDG  3RD FLR,[place] [address]  [address]  [facility] [address]  [facility] [address]  Phone: [phone]  Fax: [phone]  Primary Provider: Amanda Krueger  Pre-op Performing Provider: STEFFANY DANGELO      PREOPERATIVE EVALUATION:  Today's date: 1/31/2022    Mirta Pino is a 81 year old female who presents for a preoperative evaluation.    Surgical Information:  Surgery/Procedure: left knee arthroplasty  Surgery Location: [facility]   Surgeon: Brennan Covarrubias MD   Surgery Date: 2/7/2022  Time of Surgery: AM   Where patient plans to recover: At home with friend   Fax number for surgical facility: [phone]    Type of Anesthesia Anticipated: spinal per patient     Assessment & Plan     The proposed surgical procedure is considered INTERMEDIATE risk.    Preop general physical exam  Has not had regular medical care  - has several screening tests that need to be completed later in spring.    - EKG 12-Lead Complete w/Read (Clinics) see below - has abnormal EKG but had normal lexiscan nuclear stress test today 2/2/2022.   - X-ray Chest 2 Views                                                                  IMPRESSION:  No acute cardiopulmonary findings.     - Basic Metabolic Panel   Component Ref Range & Units 2 d ago   (1/31/22) 5 yr ago   (11/7/16) 6 yr ago   (6/15/15) 8 yr ago   (3/1/13) 8 yr ago   (2/28/13) 8 yr ago   (2/28/13) 8 yr ago   (2/27/13)    Sodium 133 - 144 mmol/L 138  141  140  138  133  135      Potassium 3.4 - 5.3 mmol/L 3.9  4.5  4.4  4.7  5.0  4.3      Chloride 94 - 109 mmol/L 106  105  106  102  98  100      Carbon Dioxide (CO2) 20 - 32 mmol/L 27  30  26  31  27  28      Anion Gap 3 - 14 mmol/L 5  6  8  5.1 Low  R  7.4 R  7.2 R      Urea Nitrogen 7 - 30 mg/dL 26  24  24  12  13  13      Creatinine 0.52 - 1.04 mg/dL 0.85  0.84  0.72  0.80  0.80  0.70  0.80     Calcium 8.5 - 10.1 mg/dL 9.9  9.0  9.1  8.4 Low  R  8.0 Low  R   8.1 Low  R      Glucose 70 - 99 mg/dL 92  90  93  99 R  115 High  R  111 High  R      GFR Estimate >60 mL/min/1.73m2 69  66 R, CM  79            - CBC with Platelets Differential  WBC Count 4.0 - 11.0 10e3/uL 7.5   6.8 R          RBC Count 3.80 - 5.20 10e6/uL 4.11   4.20 R         Hemoglobin 11.7 - 15.7 g/dL 12.0  11.9  11.8   9.1 Low   9.3 Low       Hematocrit 35.0 - 47.0 % 37.1   36.9         MCV 78 - 100 fL 90   88 R         MCH 26.5 - 33.0 pg 29.2   28.1         MCHC 31.5 - 36.5 g/dL 32.3   32.0         RDW 10.0 - 15.0 % 14.6   15.9 High          Platelet Count 150 - 450 10e3/uL 329   348 R  197 R    288 R     % Neutrophils % 69   65.6         % Lymphocytes % 20   23.7         % Monocytes % 8   8.8         % Eosinophils % 1   1.0         % Basophils % 1   0.6         % Immature Granulocytes % 1           NRBCs per 100 WBC <1 /100 0           Absolute Neutrophils 1.6 - 8.3 10e3/uL 5.3           Absolute Lymphocytes 0.8 - 5.3 10e3/uL 1.5           Absolute Monocytes 0.0 - 1.3 10e3/uL 0.6           Absolute Eosinophils 0.0 - 0.7 10e3/uL 0.1           Absolute Basophils 0.0 - 0.2 10e3/uL 0.0           Absolute Immature Granulocytes <=0.4 10e3/uL 0.0           Absolute NRBCs 10e3/uL 0.0                Acquired hypothyroidism  No meds needs f/u lab  - TSH - Reflex to FT4  TSH is 1.89    Hyperlipidemia LDL goal <100  Takes nutritional supplements will recheck  - Lipid Panel    Component Ref Range & Units 2 d ago   (1/31/22) 6 yr ago   (11/5/15) 9 yr ago   (12/10/12) 9 yr ago   (8/17/12) 11 yr ago   (2/4/11)     Cholesterol <200 mg/dL 201 High   164 CM  192 R, CM  214 High  R, CM  201 High  R, CM     Triglycerides <150 mg/dL 71  85 R, CM  81 R, CM  81 R, CM  76 R     Direct Measure HDL >=50 mg/dL 59  43 Low  R  44 Low  R  41 Low  R  46 Low  R     LDL Cholesterol Calculated <=100 mg/dL 128 High   104 R, CM  132 High  R, CM  157 High  R, CM  140 High  R, CM     Non HDL Cholesterol <130 mg/dL 142 High          Patient  Fasting > 8hrs?  No            Abnormal electrocardiogram  Her EKG is abnormal - suggestive of an anterior MI, chest pain history is vague, but given her age would want to further evaluate  - NM Lexiscan stress test  Scheduled for 2/2 AM and if + needs to see cardiology -   lexiscan test 2/2/22 is negative   NM Lexiscan stress test  Order: [id number]   Status: Final result     Visible to patient: No (scheduled for 2/2/2022  2:19 PM)     Next appt: 03/09/2022 at 10:00 AM in Family Practice (Americo Edwards MD)     Dx: Abnormal electrocardiogram     0 Result Notes    Details    Reading Physician Reading Date Result Priority   WALE Garland MD  [phone] 2/2/2022 Routine   WALE Garland MD  [phone] 2/2/2022      Result Text        The nuclear stress test is negative for inducible myocardial ischemia or infarction.     LVEDv 90ml. LVESv 25ml. LV EF 72%.            Encounter for screening mammogram for breast cancer  Overdue   - Mammogram Screening Bilateral    Osteopenia of multiple sites  Overdue   - Dexa Hip, Pelvis, Spine    Medication Instructions:  pt to hold aspirin and was told which nutritional supplements to hold - tumeric was one element      RECOMMENDATION:  Patient referred to get lexiscan and if + will need  To see cardiology  for evaluation before surgery. Surgery approval pending completion of testing and possible  consultation.  Her lexiscan stress test was negative - she is cleared for surgery.     ,Time note (e5, 40'): The total time (on the date of service) for this service was 70 minutes, including discussion/face-to-face, chart review, interpretation not otherwise reported, documentation, and updating of the computerized record.  [id number]}    Subjective     HPI related to upcoming procedure: Pt has DJD of left knee and has constant pain and difficulty walking.  She had a joint injection 11/2021 which is no longer effective       Health Care Directive:  Patient does not have a Health  Care Directive or Living Will: Discussed advance care planning with patient; information given to patient to review.    Preoperative Review of :   reviewed - no record of controlled substances prescribed.      Status of Chronic Conditions:  HYPERLIPIDEMIA - patient  Taking nutritional supplement  - cholesterol reduction complex  - last cholesterol in 2016       MULTINODULAR GOITER - not on meds  Last TSH  2.59  Was 2015     REFLUX -periodic if eats too much - no medication     COLON POLYPS  Last one in 2019  And due in 5 yrs      ABNORMAL EKG    Gets occasional SOB , gets momentary chest pain, shovelling snow  Causes her to  breathe heavy and heart pounding  - No history of MI, or sustained chest pain  - compared to EKG in 2012  todays EKG shows no r wave across the precordium     OSTEOPENIA  - needs DXA last one was 2016      Review of Systems  CONSTITUTIONAL: NEGATIVE for fever, chills, change in weight  INTEGUMENTARY/SKIN: NEGATIVE for worrisome rashes, moles or lesions  EYES: NEGATIVE for vision changes or irritation  ENT/MOUTH: NEGATIVE for ear, mouth and throat problems  RESP: NEGATIVE for significant cough or SOB  CV: NEGATIVE for chest pain, palpitations or peripheral edema  GI: NEGATIVE for nausea, abdominal pain, heartburn, or change in bowel habits  : NEGATIVE for frequency, dysuria, or hematuria  MUSCULOSKELETAL: NEGATIVE for significant arthralgias or myalgia  NEURO: NEGATIVE for weakness, dizziness or paresthesias  ENDOCRINE: NEGATIVE for temperature intolerance, skin/hair changes  HEME: NEGATIVE for bleeding problems  PSYCHIATRIC: NEGATIVE for changes in mood or affect    Patient Active Problem List    Diagnosis Date Noted     Non-toxic multinodular goiter 12/10/2015     Priority: Medium     Left knee pain 11/02/2015     Priority: Medium     OA (osteoarthritis) of knee 02/27/2013     Priority: Medium     Colon polyps 12/11/2012     Priority: Medium     Hypercholesterolemia 12/11/2012      Priority: Medium      Past Medical History:   Diagnosis Date     Colon polyps      NO ACTIVE PROBLEMS (aka NONE)      Pneumonia spring 2012     Reflux      Shingles     1 episode     Thyroid disease      Thyroid nodule     had biopsies  many yrs ago and benign - no meds     Past Surgical History:   Procedure Laterality Date     APPENDECTOMY  1962    had cj on fallopian tube and was removed also     ARTHROPLASTY MINIMALLY INVASIVE HIP  2/27/2013    Procedure: ARTHROPLASTY MINIMALLY INVASIVE HIP;  Minimally Invasive Left Two Incision Total  Hip Arthroplasty;  Surgeon: Brennan Covarrubias MD;  Location: [facility] OR     CHOLECYSTECTOMY       CHOLECYSTECTOMY  2009     COLONOSCOPY  , 2008, 11/4/2013    due in 2018 Hx of precancerous polyps, +FHx maternal aunt     COLONOSCOPY N/A 2/11/2019    Procedure: COMBINED COLONOSCOPY, SINGLE OR MULTIPLE BIOPSY/POLYPECTOMY BY BIOPSY;  Surgeon: Mary Ochoa MD;  Location: [facility] OR     HRW TOTAL KNEE REPLACEMT, CRNA Right 7/9/2015     KNEE SURGERY  2003    right meniscus     SOFT TISSUE SURGERY       SOFT TISSUE SURGERY  1982    tumor on back - benign     Z NONSPECIFIC PROCEDURE      ovarian cyst age 22     Z NONSPECIFIC PROCEDURE      lipoma back 1992     Current Outpatient Medications   Medication Sig Dispense Refill     aspirin 81 MG tablet Take 1 tablet (81 mg) by mouth daily 30 tablet 1     Cholecalciferol (VITAMIN D) 1000 UNITS capsule Take 2 capsules by mouth daily       Multiple Vitamin (MULTI-VITAMIN DAILY PO) Take 6 tablets by mouth daily. Prepackaged vitamin supplement        Nutritional Supplements (NUTRITIONAL SUPPLEMENT PO) Take 4 capsules by mouth daily Osteomatrix  Calcium carbonate, citrate, malate 1,000 mg  Vitamin D3 600 iu       Nutritional Supplements (NUTRITIONAL SUPPLEMENT PO) Take 2 tablets by mouth Cholesterol Reduction Complex       Nutritional Supplements (NUTRITIONAL SUPPLEMENT PO) Take 1 tablet by mouth daily Colovore       Nutritional Supplements  "(NUTRITIONAL SUPPLEMENT PO) Take 2 capsules by mouth Omegaguard       Nutritional Supplements (NUTRITIONAL SUPPLEMENT PO) Take 2 tablets by mouth daily Nutriferon  Immune system science       Nutritional Supplements (NUTRITIONAL SUPPLEMENT PO) Slow aging at cellular level  Liquid supplement         Allergies   Allergen Reactions     Gluten      Ibuprofen Sodium GI Disturbance     St Johns Wort-Ginseng [Harmonex] Hives        Social History     Tobacco Use     Smoking status: Former Smoker     Packs/day: 1.00     Years: 10.00     Pack years: 10.00     Types: Cigarettes     Quit date: 1981     Years since quittin.0     Smokeless tobacco: Never Used     Tobacco comment:    Substance Use Topics     Alcohol use: Yes     Alcohol/week: 2.0 standard drinks     Types: 2 Standard drinks or equivalent per week     Comment:  / glass wine daily       History   Drug Use No         Objective     /82   Pulse 74   Ht 1.549 m (5' 0.98\")   Wt 80 kg (176 lb 6.4 oz)   BMI 33.35 kg/m      Physical Exam    GENERAL APPEARANCE: healthy, alert and no distress     EYES: EOMI, PERRL     HENT: ear canals and TM's normal and nose and mouth mask     NECK: no adenopathy, no asymmetry, masses, or scars and thyroid appears generous     RESP: lungs clear to auscultation - no rales, rhonchi or wheezes     CV: regular rates and rhythm, normal S1 S2, no S3 or S4 and no murmur, click or rub     ABDOMEN:  soft, nontender, no HSM or masses and bowel sounds normal     MS: has limited flexion left knee - no swelling, mild joint line tenderness  - right knee - well healed scar      SKIN: no suspicious lesions or rashes     NEURO: Normal strength and tone, sensory exam grossly normal, mentation intact and speech normal     PSYCH: mentation appears normal. and affect normal/bright     LYMPHATICS: No cervical adenopathy    No results for input(s): HGB, PLT, INR, NA, POTASSIUM, CR, A1C in the last 10344 hours.     Diagnostics:  Labs " pending at this time.  Results will be reviewed when available.   EKG: appears abnormal , NSR, but has no r wave across the precordium and concern for anterior MI, this is changed from EKG in 2012 , Lexiscan was ordered  - lexiscan was normal 2/2/2022  CXR normal       Revised Cardiac Risk Index (RCRI):  Post normal lexiscan   The patient has no  serious cardiovascular risks for perioperative complications:       RCRI Interpretation: 0 point: Class I (low risk)           Signed Electronically by: Margie Haddad MD PhD  Copy of this evaluation report is provided to requesting physician.    Addendum: 2-3-22  Patient had a lexiscan stress test on 2-2-22..  The result was normal.  Details    Reading Physician Reading Date Result Priority   WALE Garland MD  [phone] 2/2/2022 Routine   WALE Garland MD  [phone] 2/2/2022      Result Text        The nuclear stress test is negative for inducible myocardial ischemia or infarction.     LVEDv 90ml. LVESv 25ml. LV EF 72%.     She did not need to see cardiology.  She is cleared for surgery.      Margie Haddad MD, PhD

## 2022-01-31 ENCOUNTER — OFFICE VISIT (OUTPATIENT)
Dept: FAMILY MEDICINE | Facility: CLINIC | Age: 82
End: 2022-01-31
Attending: FAMILY MEDICINE
Payer: COMMERCIAL

## 2022-01-31 ENCOUNTER — LAB (OUTPATIENT)
Dept: LAB | Facility: CLINIC | Age: 82
End: 2022-01-31
Attending: FAMILY MEDICINE
Payer: COMMERCIAL

## 2022-01-31 VITALS
DIASTOLIC BLOOD PRESSURE: 82 MMHG | BODY MASS INDEX: 33.3 KG/M2 | SYSTOLIC BLOOD PRESSURE: 138 MMHG | WEIGHT: 176.4 LBS | HEART RATE: 74 BPM | HEIGHT: 61 IN

## 2022-01-31 DIAGNOSIS — Z01.818 PREOP GENERAL PHYSICAL EXAM: ICD-10-CM

## 2022-01-31 DIAGNOSIS — M85.89 OSTEOPENIA OF MULTIPLE SITES: ICD-10-CM

## 2022-01-31 DIAGNOSIS — R94.31 ABNORMAL ELECTROCARDIOGRAM: ICD-10-CM

## 2022-01-31 DIAGNOSIS — E03.9 ACQUIRED HYPOTHYROIDISM: ICD-10-CM

## 2022-01-31 DIAGNOSIS — E78.5 HYPERLIPIDEMIA LDL GOAL <100: ICD-10-CM

## 2022-01-31 DIAGNOSIS — Z12.31 ENCOUNTER FOR SCREENING MAMMOGRAM FOR BREAST CANCER: ICD-10-CM

## 2022-01-31 DIAGNOSIS — Z01.818 PREOP GENERAL PHYSICAL EXAM: Primary | ICD-10-CM

## 2022-01-31 LAB
ANION GAP SERPL CALCULATED.3IONS-SCNC: 5 MMOL/L (ref 3–14)
BASOPHILS # BLD AUTO: 0 10E3/UL (ref 0–0.2)
BASOPHILS NFR BLD AUTO: 1 %
BUN SERPL-MCNC: 26 MG/DL (ref 7–30)
CALCIUM SERPL-MCNC: 9.9 MG/DL (ref 8.5–10.1)
CHLORIDE BLD-SCNC: 106 MMOL/L (ref 94–109)
CHOLEST SERPL-MCNC: 201 MG/DL
CO2 SERPL-SCNC: 27 MMOL/L (ref 20–32)
CREAT SERPL-MCNC: 0.85 MG/DL (ref 0.52–1.04)
EOSINOPHIL # BLD AUTO: 0.1 10E3/UL (ref 0–0.7)
EOSINOPHIL NFR BLD AUTO: 1 %
ERYTHROCYTE [DISTWIDTH] IN BLOOD BY AUTOMATED COUNT: 14.6 % (ref 10–15)
FASTING STATUS PATIENT QL REPORTED: NO
GFR SERPL CREATININE-BSD FRML MDRD: 69 ML/MIN/1.73M2
GLUCOSE BLD-MCNC: 92 MG/DL (ref 70–99)
HCT VFR BLD AUTO: 37.1 % (ref 35–47)
HDLC SERPL-MCNC: 59 MG/DL
HGB BLD-MCNC: 12 G/DL (ref 11.7–15.7)
IMM GRANULOCYTES # BLD: 0 10E3/UL
IMM GRANULOCYTES NFR BLD: 1 %
LDLC SERPL CALC-MCNC: 128 MG/DL
LYMPHOCYTES # BLD AUTO: 1.5 10E3/UL (ref 0.8–5.3)
LYMPHOCYTES NFR BLD AUTO: 20 %
MCH RBC QN AUTO: 29.2 PG (ref 26.5–33)
MCHC RBC AUTO-ENTMCNC: 32.3 G/DL (ref 31.5–36.5)
MCV RBC AUTO: 90 FL (ref 78–100)
MONOCYTES # BLD AUTO: 0.6 10E3/UL (ref 0–1.3)
MONOCYTES NFR BLD AUTO: 8 %
NEUTROPHILS # BLD AUTO: 5.3 10E3/UL (ref 1.6–8.3)
NEUTROPHILS NFR BLD AUTO: 69 %
NONHDLC SERPL-MCNC: 142 MG/DL
NRBC # BLD AUTO: 0 10E3/UL
NRBC BLD AUTO-RTO: 0 /100
PLATELET # BLD AUTO: 329 10E3/UL (ref 150–450)
POTASSIUM BLD-SCNC: 3.9 MMOL/L (ref 3.4–5.3)
RBC # BLD AUTO: 4.11 10E6/UL (ref 3.8–5.2)
SODIUM SERPL-SCNC: 138 MMOL/L (ref 133–144)
TRIGL SERPL-MCNC: 71 MG/DL
TSH SERPL DL<=0.005 MIU/L-ACNC: 1.89 MU/L (ref 0.4–4)
WBC # BLD AUTO: 7.5 10E3/UL (ref 4–11)

## 2022-01-31 PROCEDURE — 99205 OFFICE O/P NEW HI 60 MIN: CPT | Performed by: FAMILY MEDICINE

## 2022-01-31 PROCEDURE — 85004 AUTOMATED DIFF WBC COUNT: CPT

## 2022-01-31 PROCEDURE — 80061 LIPID PANEL: CPT

## 2022-01-31 PROCEDURE — G0463 HOSPITAL OUTPT CLINIC VISIT: HCPCS | Mod: 25

## 2022-01-31 PROCEDURE — 93010 ELECTROCARDIOGRAM REPORT: CPT | Performed by: INTERNAL MEDICINE

## 2022-01-31 PROCEDURE — 99417 PROLNG OP E/M EACH 15 MIN: CPT | Performed by: FAMILY MEDICINE

## 2022-01-31 PROCEDURE — 93005 ELECTROCARDIOGRAM TRACING: CPT | Performed by: FAMILY MEDICINE

## 2022-01-31 PROCEDURE — 36415 COLL VENOUS BLD VENIPUNCTURE: CPT

## 2022-01-31 PROCEDURE — 84443 ASSAY THYROID STIM HORMONE: CPT

## 2022-01-31 PROCEDURE — 80048 BASIC METABOLIC PNL TOTAL CA: CPT

## 2022-01-31 ASSESSMENT — MIFFLIN-ST. JEOR: SCORE: 1207.27

## 2022-01-31 ASSESSMENT — PAIN SCALES - GENERAL: PAINLEVEL: NO PAIN (0)

## 2022-01-31 NOTE — PATIENT INSTRUCTIONS
lexiscan  Wed morning 8 AM at the [facility]  If positive will need to see cardiology  Blood work today  Will get CXR after the lexiscan on Wed.    Mammogram when you get time in the spring  Colonoscopy  2024  Bone density in the spring        Stop supplements and ASA

## 2022-01-31 NOTE — LETTER
1/31/2022       RE: Mirta Pino  [address]  [facility] [address]     Dear Colleague,    Thank you for referring your patient, Mirta Pino, to the [facility]. Please see a copy of my visit note below.    [facility] PROFESSIONAL BLDG  3RD FLR,[place] [address]  [address]  [facility] [address]  [facility] [address]  Phone: [phone]  Fax: [phone]  Primary Provider: Amanda Krueger  Pre-op Performing Provider: STEFFANY DANGELO      PREOPERATIVE EVALUATION:  Today's date: 1/31/2022    Mirta Pino is a 81 year old female who presents for a preoperative evaluation.    Surgical Information:  Surgery/Procedure: left knee arthroplasty  Surgery Location: [facility]   Surgeon: Brennan Covarrubias MD   Surgery Date: 2/7/2022  Time of Surgery: AM   Where patient plans to recover: At home with friend   Fax number for surgical facility: [phone]    Type of Anesthesia Anticipated: spinal per patient     Assessment & Plan     The proposed surgical procedure is considered INTERMEDIATE risk.    Preop general physical exam  Has not had regular medical care  - has several screening tests that need to be completed later in spring.    - EKG 12-Lead Complete w/Read (Clinics) see below - has abnormal EKG but had normal lexiscan nuclear stress test today 2/2/2022.   - X-ray Chest 2 Views                                                                  IMPRESSION:  No acute cardiopulmonary findings.     - Basic Metabolic Panel   Component Ref Range & Units 2 d ago   (1/31/22) 5 yr ago   (11/7/16) 6 yr ago   (6/15/15) 8 yr ago   (3/1/13) 8 yr ago   (2/28/13) 8 yr ago   (2/28/13) 8 yr ago   (2/27/13)    Sodium 133 - 144 mmol/L 138  141  140  138  133  135      Potassium 3.4 - 5.3 mmol/L 3.9  4.5  4.4  4.7  5.0  4.3      Chloride 94 - 109 mmol/L 106  105  106  102  98  100      Carbon  Dioxide (CO2) 20 - 32 mmol/L 27  30  26  31  27  28      Anion Gap 3 - 14 mmol/L 5  6  8  5.1 Low  R  7.4 R  7.2 R      Urea Nitrogen 7 - 30 mg/dL 26  24  24  12  13  13      Creatinine 0.52 - 1.04 mg/dL 0.85  0.84  0.72  0.80  0.80  0.70  0.80     Calcium 8.5 - 10.1 mg/dL 9.9  9.0  9.1  8.4 Low  R  8.0 Low  R  8.1 Low  R      Glucose 70 - 99 mg/dL 92  90  93  99 R  115 High  R  111 High  R      GFR Estimate >60 mL/min/1.73m2 69  66 R, CM  79            - CBC with Platelets Differential  WBC Count 4.0 - 11.0 10e3/uL 7.5   6.8 R          RBC Count 3.80 - 5.20 10e6/uL 4.11   4.20 R         Hemoglobin 11.7 - 15.7 g/dL 12.0  11.9  11.8   9.1 Low   9.3 Low       Hematocrit 35.0 - 47.0 % 37.1   36.9         MCV 78 - 100 fL 90   88 R         MCH 26.5 - 33.0 pg 29.2   28.1         MCHC 31.5 - 36.5 g/dL 32.3   32.0         RDW 10.0 - 15.0 % 14.6   15.9 High          Platelet Count 150 - 450 10e3/uL 329   348 R  197 R    288 R     % Neutrophils % 69   65.6         % Lymphocytes % 20   23.7         % Monocytes % 8   8.8         % Eosinophils % 1   1.0         % Basophils % 1   0.6         % Immature Granulocytes % 1           NRBCs per 100 WBC <1 /100 0           Absolute Neutrophils 1.6 - 8.3 10e3/uL 5.3           Absolute Lymphocytes 0.8 - 5.3 10e3/uL 1.5           Absolute Monocytes 0.0 - 1.3 10e3/uL 0.6           Absolute Eosinophils 0.0 - 0.7 10e3/uL 0.1           Absolute Basophils 0.0 - 0.2 10e3/uL 0.0           Absolute Immature Granulocytes <=0.4 10e3/uL 0.0           Absolute NRBCs 10e3/uL 0.0                Acquired hypothyroidism  No meds needs f/u lab  - TSH - Reflex to FT4  TSH is 1.89    Hyperlipidemia LDL goal <100  Takes nutritional supplements will recheck  - Lipid Panel    Component Ref Range & Units 2 d ago   (1/31/22) 6 yr ago   (11/5/15) 9 yr ago   (12/10/12) 9 yr ago   (8/17/12) 11 yr ago   (2/4/11)     Cholesterol <200 mg/dL 201 High   164 CM  192 R, CM  214 High  R, CM  201 High  R, CM      Triglycerides <150 mg/dL 71  85 R, CM  81 R, CM  81 R, CM  76 R     Direct Measure HDL >=50 mg/dL 59  43 Low  R  44 Low  R  41 Low  R  46 Low  R     LDL Cholesterol Calculated <=100 mg/dL 128 High   104 R, CM  132 High  R, CM  157 High  R, CM  140 High  R, CM     Non HDL Cholesterol <130 mg/dL 142 High          Patient Fasting > 8hrs?  No            Abnormal electrocardiogram  Her EKG is abnormal - suggestive of an anterior MI, chest pain history is vague, but given her age would want to further evaluate  - NM Lexiscan stress test  Scheduled for 2/2 AM and if + needs to see cardiology -   lexiscan test 2/2/22 is negative   NM Lexiscan stress test  Order: [id number]   Status: Final result     Visible to patient: No (scheduled for 2/2/2022  2:19 PM)     Next appt: 03/09/2022 at 10:00 AM in Family Practice (Americo Edwards MD)     Dx: Abnormal electrocardiogram     0 Result Notes    Details    Reading Physician Reading Date Result Priority   WALE Garland MD  [phone] 2/2/2022 Routine   WALE Garland MD  [phone] 2/2/2022      Result Text        The nuclear stress test is negative for inducible myocardial ischemia or infarction.     LVEDv 90ml. LVESv 25ml. LV EF 72%.            Encounter for screening mammogram for breast cancer  Overdue   - Mammogram Screening Bilateral    Osteopenia of multiple sites  Overdue   - Dexa Hip, Pelvis, Spine    Medication Instructions:  pt to hold aspirin and was told which nutritional supplements to hold - tumeric was one element      RECOMMENDATION:  Patient referred to get lexiscan and if + will need  To see cardiology  for evaluation before surgery. Surgery approval pending completion of testing and possible  consultation.  Her lexiscan stress test was negative - she is cleared for surgery.     ,Time note (e5, 40'): The total time (on the date of service) for this service was 70 minutes, including discussion/face-to-face, chart review, interpretation not otherwise  reported, documentation, and updating of the computerized record.  0956}    Subjective     HPI related to upcoming procedure: Pt has DJD of left knee and has constant pain and difficulty walking.  She had a joint injection 11/2021 which is no longer effective       Health Care Directive:  Patient does not have a Health Care Directive or Living Will: Discussed advance care planning with patient; information given to patient to review.    Preoperative Review of :   reviewed - no record of controlled substances prescribed.      Status of Chronic Conditions:  HYPERLIPIDEMIA - patient  Taking nutritional supplement  - cholesterol reduction complex  - last cholesterol in 2016       MULTINODULAR GOITER - not on meds  Last TSH  2.59  Was 2015     REFLUX -periodic if eats too much - no medication     COLON POLYPS  Last one in 2019  And due in 5 yrs      ABNORMAL EKG    Gets occasional SOB , gets momentary chest pain, shovelling snow  Causes her to  breathe heavy and heart pounding  - No history of MI, or sustained chest pain  - compared to EKG in 2012  todays EKG shows no r wave across the precordium     OSTEOPENIA  - needs DXA last one was 2016      Review of Systems  CONSTITUTIONAL: NEGATIVE for fever, chills, change in weight  INTEGUMENTARY/SKIN: NEGATIVE for worrisome rashes, moles or lesions  EYES: NEGATIVE for vision changes or irritation  ENT/MOUTH: NEGATIVE for ear, mouth and throat problems  RESP: NEGATIVE for significant cough or SOB  CV: NEGATIVE for chest pain, palpitations or peripheral edema  GI: NEGATIVE for nausea, abdominal pain, heartburn, or change in bowel habits  : NEGATIVE for frequency, dysuria, or hematuria  MUSCULOSKELETAL: NEGATIVE for significant arthralgias or myalgia  NEURO: NEGATIVE for weakness, dizziness or paresthesias  ENDOCRINE: NEGATIVE for temperature intolerance, skin/hair changes  HEME: NEGATIVE for bleeding problems  PSYCHIATRIC: NEGATIVE for changes in mood or  affect    Patient Active Problem List    Diagnosis Date Noted     Non-toxic multinodular goiter 12/10/2015     Priority: Medium     Left knee pain 11/02/2015     Priority: Medium     OA (osteoarthritis) of knee 02/27/2013     Priority: Medium     Colon polyps 12/11/2012     Priority: Medium     Hypercholesterolemia 12/11/2012     Priority: Medium      Past Medical History:   Diagnosis Date     Colon polyps      NO ACTIVE PROBLEMS (aka NONE)      Pneumonia spring 2012     Reflux      Shingles     1 episode     Thyroid disease      Thyroid nodule     had biopsies  many yrs ago and benign - no meds     Past Surgical History:   Procedure Laterality Date     APPENDECTOMY  1962    had jc on fallopian tube and was removed also     ARTHROPLASTY MINIMALLY INVASIVE HIP  2/27/2013    Procedure: ARTHROPLASTY MINIMALLY INVASIVE HIP;  Minimally Invasive Left Two Incision Total  Hip Arthroplasty;  Surgeon: Brennan Covarrubias MD;  [facility]: [facility]     CHOLECYSTECTOMY       CHOLECYSTECTOMY  2009     COLONOSCOPY  , 2008, 11/4/2013    due in 2018 Hx of precancerous polyps, +FHx maternal aunt     COLONOSCOPY N/A 2/11/2019    Procedure: COMBINED COLONOSCOPY, SINGLE OR MULTIPLE BIOPSY/POLYPECTOMY BY BIOPSY;  Surgeon: Mary Ochoa MD;  [facility]     HRW TOTAL KNEE REPLACEMT, CRNA Right 7/9/2015     KNEE SURGERY  2003    right meniscus     SOFT TISSUE SURGERY       SOFT TISSUE SURGERY  1982    tumor on back - benign     [facility] NONSPECIFIC PROCEDURE      ovarian cyst age 22     [facility] NONSPECIFIC PROCEDURE      lipoma back 1992     Current Outpatient Medications   Medication Sig Dispense Refill     aspirin 81 MG tablet Take 1 tablet (81 mg) by mouth daily 30 tablet 1     Cholecalciferol (VITAMIN D) 1000 UNITS capsule Take 2 capsules by mouth daily       Multiple Vitamin (MULTI-VITAMIN DAILY PO) Take 6 tablets by mouth daily. Prepackaged vitamin supplement        Nutritional Supplements (NUTRITIONAL SUPPLEMENT PO) Take 4 capsules by  "mouth daily Osteomatrix  Calcium carbonate, citrate, malate 1,000 mg  Vitamin D3 600 iu       Nutritional Supplements (NUTRITIONAL SUPPLEMENT PO) Take 2 tablets by mouth Cholesterol Reduction Complex       Nutritional Supplements (NUTRITIONAL SUPPLEMENT PO) Take 1 tablet by mouth daily Mindworks       Nutritional Supplements (NUTRITIONAL SUPPLEMENT PO) Take 2 capsules by mouth Omegaguard       Nutritional Supplements (NUTRITIONAL SUPPLEMENT PO) Take 2 tablets by mouth daily Nutriferon  Immune system science       Nutritional Supplements (NUTRITIONAL SUPPLEMENT PO) Slow aging at cellular level  Liquid supplement         Allergies   Allergen Reactions     Gluten      Ibuprofen Sodium GI Disturbance     [facility] Wort-Ginseng [Harmonex] Hives        Social History     Tobacco Use     Smoking status: Former Smoker     Packs/day: 1.00     Years: 10.00     Pack years: 10.00     Types: Cigarettes     Quit date: 1981     Years since quittin.0     Smokeless tobacco: Never Used     Tobacco comment:    Substance Use Topics     Alcohol use: Yes     Alcohol/week: 2.0 standard drinks     Types: 2 Standard drinks or equivalent per week     Comment:  1/2 glass wine daily       History   Drug Use No         Objective     /82   Pulse 74   Ht 1.549 m (5' 0.98\")   Wt 80 kg (176 lb 6.4 oz)   BMI 33.35 kg/m      Physical Exam    GENERAL APPEARANCE: healthy, alert and no distress     EYES: EOMI, PERRL     HENT: ear canals and TM's normal and nose and mouth mask     NECK: no adenopathy, no asymmetry, masses, or scars and thyroid appears generous     RESP: lungs clear to auscultation - no rales, rhonchi or wheezes     CV: regular rates and rhythm, normal S1 S2, no S3 or S4 and no murmur, click or rub     ABDOMEN:  soft, nontender, no HSM or masses and bowel sounds normal     MS: has limited flexion left knee - no swelling, mild joint line tenderness  - right knee - well healed scar      SKIN: no suspicious lesions or " rashes     NEURO: Normal strength and tone, sensory exam grossly normal, mentation intact and speech normal     PSYCH: mentation appears normal. and affect normal/bright     LYMPHATICS: No cervical adenopathy    No results for input(s): HGB, PLT, INR, NA, POTASSIUM, CR, A1C in the last 24252 hours.     Diagnostics:  Labs pending at this time.  Results will be reviewed when available.   EKG: appears abnormal , NSR, but has no r wave across the precordium and concern for anterior MI, this is changed from EKG in 2012 , Lexiscan was ordered  - lexiscan was normal 2/2/2022  CXR normal       Revised Cardiac Risk Index (RCRI):  Post normal lexiscan   The patient has no  serious cardiovascular risks for perioperative complications:       RCRI Interpretation: 0 point: Class I (low risk)           Signed Electronically by: Margie Haddad MD PhD  Copy of this evaluation report is provided to requesting physician.

## 2022-02-02 ENCOUNTER — TELEPHONE (OUTPATIENT)
Dept: OBGYN | Facility: CLINIC | Age: 82
End: 2022-02-02

## 2022-02-02 ENCOUNTER — HOSPITAL ENCOUNTER (OUTPATIENT)
Dept: NUCLEAR MEDICINE | Facility: CLINIC | Age: 82
End: 2022-02-02
Attending: FAMILY MEDICINE
Payer: COMMERCIAL

## 2022-02-02 ENCOUNTER — HOSPITAL ENCOUNTER (OUTPATIENT)
Dept: NUCLEAR MEDICINE | Facility: CLINIC | Age: 82
Setting detail: NUCLEAR MEDICINE
End: 2022-02-02
Attending: FAMILY MEDICINE
Payer: COMMERCIAL

## 2022-02-02 ENCOUNTER — HOSPITAL ENCOUNTER (OUTPATIENT)
Dept: CARDIOLOGY | Facility: CLINIC | Age: 82
End: 2022-02-02
Attending: FAMILY MEDICINE
Payer: COMMERCIAL

## 2022-02-02 ENCOUNTER — HOSPITAL ENCOUNTER (OUTPATIENT)
Dept: GENERAL RADIOLOGY | Facility: CLINIC | Age: 82
End: 2022-02-02
Attending: FAMILY MEDICINE
Payer: COMMERCIAL

## 2022-02-02 DIAGNOSIS — Z01.818 PREOP GENERAL PHYSICAL EXAM: ICD-10-CM

## 2022-02-02 DIAGNOSIS — R94.31 ABNORMAL ELECTROCARDIOGRAM: ICD-10-CM

## 2022-02-02 LAB
ATRIAL RATE - MUSE: 67 BPM
DIASTOLIC BLOOD PRESSURE - MUSE: NORMAL MMHG
INTERPRETATION ECG - MUSE: NORMAL
P AXIS - MUSE: 25 DEGREES
PR INTERVAL - MUSE: 138 MS
QRS DURATION - MUSE: 80 MS
QT - MUSE: 380 MS
QTC - MUSE: 401 MS
R AXIS - MUSE: 11 DEGREES
SYSTOLIC BLOOD PRESSURE - MUSE: NORMAL MMHG
T AXIS - MUSE: 24 DEGREES
VENTRICULAR RATE- MUSE: 67 BPM

## 2022-02-02 PROCEDURE — 93017 CV STRESS TEST TRACING ONLY: CPT

## 2022-02-02 PROCEDURE — 71046 X-RAY EXAM CHEST 2 VIEWS: CPT

## 2022-02-02 PROCEDURE — 93016 CV STRESS TEST SUPVJ ONLY: CPT | Performed by: INTERNAL MEDICINE

## 2022-02-02 PROCEDURE — 78452 HT MUSCLE IMAGE SPECT MULT: CPT

## 2022-02-02 PROCEDURE — 343N000001 HC RX 343: Performed by: FAMILY MEDICINE

## 2022-02-02 PROCEDURE — A9502 TC99M TETROFOSMIN: HCPCS | Performed by: FAMILY MEDICINE

## 2022-02-02 PROCEDURE — 250N000011 HC RX IP 250 OP 636: Performed by: INTERNAL MEDICINE

## 2022-02-02 PROCEDURE — 93018 CV STRESS TEST I&R ONLY: CPT | Performed by: INTERNAL MEDICINE

## 2022-02-02 PROCEDURE — 78452 HT MUSCLE IMAGE SPECT MULT: CPT | Mod: 26 | Performed by: INTERNAL MEDICINE

## 2022-02-02 PROCEDURE — 71046 X-RAY EXAM CHEST 2 VIEWS: CPT | Mod: 26 | Performed by: RADIOLOGY

## 2022-02-02 RX ORDER — ACYCLOVIR 200 MG/1
0-1 CAPSULE ORAL
Status: DISCONTINUED | OUTPATIENT
Start: 2022-02-02 | End: 2022-02-03 | Stop reason: HOSPADM

## 2022-02-02 RX ORDER — AMINOPHYLLINE 25 MG/ML
50-100 INJECTION, SOLUTION INTRAVENOUS
Status: DISCONTINUED | OUTPATIENT
Start: 2022-02-02 | End: 2022-02-03 | Stop reason: HOSPADM

## 2022-02-02 RX ORDER — REGADENOSON 0.08 MG/ML
0.4 INJECTION, SOLUTION INTRAVENOUS ONCE
Status: COMPLETED | OUTPATIENT
Start: 2022-02-02 | End: 2022-02-02

## 2022-02-02 RX ORDER — ALBUTEROL SULFATE 90 UG/1
2 AEROSOL, METERED RESPIRATORY (INHALATION) EVERY 5 MIN PRN
Status: DISCONTINUED | OUTPATIENT
Start: 2022-02-02 | End: 2022-02-03 | Stop reason: HOSPADM

## 2022-02-02 RX ORDER — CAFFEINE CITRATE 20 MG/ML
60 SOLUTION INTRAVENOUS
Status: DISCONTINUED | OUTPATIENT
Start: 2022-02-02 | End: 2022-02-03 | Stop reason: HOSPADM

## 2022-02-02 RX ADMIN — TETROFOSMIN 11 MCI.: 1.38 INJECTION, POWDER, LYOPHILIZED, FOR SOLUTION INTRAVENOUS at 08:49

## 2022-02-02 RX ADMIN — TETROFOSMIN 42 MCI.: 1.38 INJECTION, POWDER, LYOPHILIZED, FOR SOLUTION INTRAVENOUS at 10:00

## 2022-02-02 RX ADMIN — REGADENOSON 0.4 MG: 0.08 INJECTION, SOLUTION INTRAVENOUS at 09:54

## 2022-02-02 NOTE — PROGRESS NOTES
Pt here for Lexiscan nuclear stress test.  Medication and side effects reviewed with patient. Lung sounds clear to auscultation bilaterally.  Denied caffeine use.  Patient tolerated Lexiscan dose without any adverse reactions.  VSS.  Monitored post injection and then taken to the [facility] waiting room and instructed to wait there for nuclear medicine tech for follow up imaging.

## 2022-02-02 NOTE — TELEPHONE ENCOUNTER
----- Message from Margie Haddad MD PhD sent at 2/2/2022  2:47 PM CST -----  Hi  The preop that I did on Monday on this pt needs to get faxed to [facility] - fax # is on the preop form.  Also fax the EKG we did Monday and a copy of the LEXISCAN done today - under cardiology - she had a neg lexiscan and she is cleared for surgery.  Thanks  Margie

## 2022-02-03 ENCOUNTER — TELEPHONE (OUTPATIENT)
Dept: FAMILY MEDICINE | Facility: CLINIC | Age: 82
End: 2022-02-03
Payer: COMMERCIAL

## 2022-02-03 NOTE — TELEPHONE ENCOUNTER
[facility] Call Center    Phone Message    May a detailed message be left on voicemail: yes     Reason for Call: Patient was seen for preop of 1/31/22.  Patient needs cardiology work up and they are wondering if her paperwork can be addended. Please reach out to [place].    Action Taken: Message routed to:  [facility] ([facility] S    Travel Screening: Not Applicable

## 2022-03-09 ENCOUNTER — OFFICE VISIT (OUTPATIENT)
Dept: FAMILY MEDICINE | Facility: CLINIC | Age: 82
End: 2022-03-09
Attending: FAMILY MEDICINE
Payer: COMMERCIAL

## 2022-03-09 VITALS
DIASTOLIC BLOOD PRESSURE: 86 MMHG | HEART RATE: 67 BPM | HEIGHT: 62 IN | SYSTOLIC BLOOD PRESSURE: 135 MMHG | BODY MASS INDEX: 32.94 KG/M2 | WEIGHT: 179 LBS

## 2022-03-09 DIAGNOSIS — Z01.818 PRE-OP EXAM: Primary | ICD-10-CM

## 2022-03-09 PROCEDURE — G0463 HOSPITAL OUTPT CLINIC VISIT: HCPCS

## 2022-03-09 PROCEDURE — 99214 OFFICE O/P EST MOD 30 MIN: CPT | Performed by: FAMILY MEDICINE

## 2022-03-09 ASSESSMENT — PAIN SCALES - GENERAL: PAINLEVEL: NO PAIN (0)

## 2022-03-09 NOTE — PROGRESS NOTES
Chief Complaint   Patient presents with     Pre Op Exam     DOS 3/14/2022 Cataract    [facility]  fax # [phone]   Odalis Barger LPN

## 2022-03-09 NOTE — LETTER
3/9/2022     RE: Mirta iPno  [address]  [facility] [address]     Dear Colleague,    Thank you for referring your patient, Mirta Pino, to the [facility] at [facility]. Please see a copy of my visit note below.    Chief Complaint   Patient presents with     Pre Op Exam     DOS 3/14/2022 Cataract    [facility]  fax # [phone]   Odalis Barger LPN    [facility] PROFESSIONAL BLDG  3RD FLR,[place] [address]  [address]  [facility] [address]  [facility] [address]  Phone: [phone]  Fax: [phone]  Primary Provider: Amanda Krueger        PREOPERATIVE EVALUATION:  Today's date: 3/9/2022    Mirta Pino is a 81 year old female who presents for a preoperative evaluation.    Surgical Information:  Surgery/Procedure:Cataract, bilateral  [facility]  Surgeon: Dr. Sierra Hodge  Surgery Date: 3/14/2022 R eye, L  Eye 4/14/2022  Where patient plans to recover: At home alone  Fax number for surgical facility: [phone]    Type of Anesthesia Anticipated: to be determined    Assessment & Plan     The proposed surgical procedure is considered LOW risk.    Problem List Items Addressed This Visit     None      Visit Diagnoses     Pre-op exam    -  Primary            RECOMMENDATION:  APPROVAL GIVEN to proceed with proposed procedure, without further diagnostic evaluation.        Subjective     HPI related to upcoming procedure: Patient diagnosed with cataracts in both eyes, and these are interfering with vision and surgery recommended.     1. No - Have you ever had a heart attack or stroke?  2. No - Have you ever had surgery on your heart or blood vessels, such as a stent, coronary (heart) bypass, or surgery on an artery in the head, neck, heart, or legs?  3. No - Do you have chest pain when you are physically active?  4. No -  Do you have a history of heart failure?  5. No - Do you currently have a cold, bronchitis, or symptoms of other respiratory (head and chest) infections?  6. No - Do you have a cough, shortness of breath, or wheezing?  7. No - Do you or anyone in your family have a history of blood clots?  8. No - Do you or anyone in your family have a serious bleeding problem, such as long-lasting bleeding after surgeries or cuts?  9. No - Have you ever had anemia or been told to take iron pills?  10. No - Have you had any abnormal blood loss such as black, tarry or bloody stools, or abnormal vaginal bleeding?  11. No - Have you ever had a blood transfusion?  12. Yes - Are you willing to have a blood transfusion if it is medically needed before, during, or after your surgery?  13. No - Have you or anyone in your family ever had problems with anesthesia (sedation for surgery)?  14. No - Do you have sleep apnea, excessive snoring, or daytime drowsiness?   15. No - Do you have any artifical heart valves or other implanted medical devices, such as a pacemaker, defibrillator, or continuous glucose monitor?  16. Yes - Do you have any artifical joints? Knee, hp  17. No - Are you allergic to latex?  18. No - Is there any chance that you may be pregnant?      Health Care Directive:  Patient does not have a Health Care Directive or Living Will: Discussed advance care planning with patient; however, patient declined at this time.[place]scussed [place]    Preoperative Review of :   reviewed - no record of controlled substances prescribed.      Status of Chronic Conditions:  HYPERLIPIDEMIA - Patient has a long history of significant Hyperlipidemia, treated with dietary supplements.  Osteoarthritis--symptoms now primarily in left knee    Review of Systems     Constitutional, neuro, ENT, endocrine, pulmonary, cardiac, gastrointestinal, genitourinary, musculoskeletal, integument and psychiatric systems are negative, except as otherwise  noted.    Patient Active Problem List    Diagnosis Date Noted     Non-toxic multinodular goiter 12/10/2015     Priority: Medium     Left knee pain 11/02/2015     Priority: Medium     OA (osteoarthritis) of knee 02/27/2013     Priority: Medium     Colon polyps 12/11/2012     Priority: Medium     Hypercholesterolemia 12/11/2012     Priority: Medium      Past Medical History:   Diagnosis Date     Colon polyps      NO ACTIVE PROBLEMS (aka NONE)      Pneumonia spring 2012     Reflux      Shingles     1 episode     Thyroid disease      Thyroid nodule     had biopsies  many yrs ago and benign - no meds     Past Surgical History:   Procedure Laterality Date     APPENDECTOMY  1962    had jc on fallopian tube and was removed also     ARTHROPLASTY MINIMALLY INVASIVE HIP  2/27/2013    Procedure: ARTHROPLASTY MINIMALLY INVASIVE HIP;  Minimally Invasive Left Two Incision Total  Hip Arthroplasty;  Surgeon: Brennan Covarrubias MD;  [facility]     CHOLECYSTECTOMY       CHOLECYSTECTOMY  2009     COLONOSCOPY  , 2008, 11/4/2013    due in 2018 Hx of precancerous polyps, +FHx maternal aunt     COLONOSCOPY N/A 2/11/2019    Procedure: COMBINED COLONOSCOPY, SINGLE OR MULTIPLE BIOPSY/POLYPECTOMY BY BIOPSY;  Surgeon: Mary Ochoa MD;  [facility]     HRW TOTAL KNEE REPLACEMT, CRNA Right 7/9/2015     KNEE SURGERY  2003    right meniscus     SOFT TISSUE SURGERY       SOFT TISSUE SURGERY  1982    tumor on back - benign     [facility] NONSPECIFIC PROCEDURE      ovarian cyst age 22     [facility] NONSPECIFIC PROCEDURE      lipoma back 1992     Current Outpatient Medications   Medication Sig Dispense Refill     Cholecalciferol (VITAMIN D) 1000 UNITS capsule Take 2 capsules by mouth daily       Multiple Vitamin (MULTI-VITAMIN DAILY PO) Take 6 tablets by mouth daily. Prepackaged vitamin supplement        Nutritional Supplements (NUTRITIONAL SUPPLEMENT PO) Take 4 capsules by mouth daily Osteomatrix  Calcium carbonate, citrate, malate 1,000  mg  Vitamin D3 600 iu       Nutritional Supplements (NUTRITIONAL SUPPLEMENT PO) Take 2 tablets by mouth Cholesterol Reduction Complex       Nutritional Supplements (NUTRITIONAL SUPPLEMENT PO) Take 1 tablet by mouth daily Mindworks       Nutritional Supplements (NUTRITIONAL SUPPLEMENT PO) Take 2 capsules by mouth Omegaguard       Nutritional Supplements (NUTRITIONAL SUPPLEMENT PO) Take 2 tablets by mouth daily Nutriferon  Immune system science       Nutritional Supplements (NUTRITIONAL SUPPLEMENT PO) Slow aging at cellular level  Liquid supplement         Allergies   Allergen Reactions     Gluten      Ibuprofen Sodium GI Disturbance     [facility] Wort-Ginseng [Harmonex] Hives        Social History     Tobacco Use     Smoking status: Former Smoker     Packs/day: 1.00     Years: 10.00     Pack years: 10.00     Types: Cigarettes     Quit date: 1981     Years since quittin.2     Smokeless tobacco: Never Used     Tobacco comment:    Substance Use Topics     Alcohol use: Yes     Alcohol/week: 2.0 standard drinks     Types: 2 Standard drinks or equivalent per week     Comment:  1/2 glass wine daily     .  History   Drug Use No             .  lexiscan test 22 is negative   NM Lexiscan stress test  Order: [id number]   Status: Final result     Visible to patient: No (scheduled for 2022  2:19 PM)     Next appt: 2022 at 10:00 AM in Family Practice (Americo Edwards MD)     Dx: Abnormal electrocardiogram     0 Result Notes     Details     Reading Physician Reading Date Result Priority   WALE Garland MD  [phone] 2022 Routine   WALE Garland MD  [phone] 2022        Result Text        The nuclear stress test is negative for inducible myocardial ischemia or infarction.     LVEDv 90ml. LVESv 25ml. LV EF 72%.               Objective     .not currently breastfeeding.      Physical Exam    GENERAL APPEARANCE: healthy, alert and no distress     EYES: EOMI, PERRL     HENT: ear canals  and TM's normal and nose and mouth without ulcers or lesions     NECK: no adenopathy, no asymmetry, masses, or scars and thyroid normal to palpation     RESP: lungs clear to auscultation - no rales, rhonchi or wheezes     CV: regular rates and rhythm, normal S1 S2, no S3 or S4 and no murmur, click or rub     ABDOMEN:  soft, nontender, no HSM or masses and bowel sounds normal     MS: extremities normal- no gross deformities noted, no evidence of inflammation in joints, FROM in all extremities.     NEURO: Normal strength and tone, sensory exam grossly normal, mentation intact and speech normal     PSYCH: mentation appears normal. and affect normal/bright     LYMPHATICS: No cervical adenopathy    Recent Labs   Lab Test 01/31/22  1605   HGB 12.0         POTASSIUM 3.9   CR 0.85        Diagnostics:  No labs were ordered during this visit.   No EKG required for low risk surgery (cataract, skin procedure, breast biopsy, etc).    Revised Cardiac Risk Index (RCRI):  The patient has the following serious cardiovascular risks for perioperative complications:   - No serious cardiac risks = 0 points     RCRI Interpretation: 0 points: Class I (very low risk - 0.4% complication rate)        Signed Electronically by: Americo Edwards MD  Copy of this evaluation report is provided to requesting physician.

## 2022-03-09 NOTE — PROGRESS NOTES
[facility] PROFESSIONAL BLDG  3RD FLR,[place] [address]  [address]  [facility] [address]  [facility] [address]  Phone: [phone]  Fax: [phone]  Primary Provider: Amanda Krueger        PREOPERATIVE EVALUATION:  Today's date: 3/9/2022    Mirta Pino is a 81 year old female who presents for a preoperative evaluation.    Surgical Information:  Surgery/Procedure:Cataract, bilateral  Surgery [facility]: [facility]  Surgeon: Dr. Sierra Hodge  Surgery Date: 3/14/2022 R eye, L  Eye 4/14/2022  Where patient plans to recover: At home alone  Fax number for surgical facility: [phone]    Type of Anesthesia Anticipated: to be determined    Assessment & Plan     The proposed surgical procedure is considered LOW risk.    Problem List Items Addressed This Visit     None      Visit Diagnoses     Pre-op exam    -  Primary            RECOMMENDATION:  APPROVAL GIVEN to proceed with proposed procedure, without further diagnostic evaluation.        Subjective     HPI related to upcoming procedure: Patient diagnosed with cataracts in both eyes, and these are interfering with vision and surgery recommended.     1. No - Have you ever had a heart attack or stroke?  2. No - Have you ever had surgery on your heart or blood vessels, such as a stent, coronary (heart) bypass, or surgery on an artery in the head, neck, heart, or legs?  3. No - Do you have chest pain when you are physically active?  4. No - Do you have a history of heart failure?  5. No - Do you currently have a cold, bronchitis, or symptoms of other respiratory (head and chest) infections?  6. No - Do you have a cough, shortness of breath, or wheezing?  7. No - Do you or anyone in your family have a history of blood clots?  8. No - Do you or anyone in your family have a serious bleeding problem, such as long-lasting bleeding after surgeries or cuts?  9. No - Have you ever had anemia or been told to  take iron pills?  10. No - Have you had any abnormal blood loss such as black, tarry or bloody stools, or abnormal vaginal bleeding?  11. No - Have you ever had a blood transfusion?  12. Yes - Are you willing to have a blood transfusion if it is medically needed before, during, or after your surgery?  13. No - Have you or anyone in your family ever had problems with anesthesia (sedation for surgery)?  14. No - Do you have sleep apnea, excessive snoring, or daytime drowsiness?   15. No - Do you have any artifical heart valves or other implanted medical devices, such as a pacemaker, defibrillator, or continuous glucose monitor?  16. Yes - Do you have any artifical joints? Knee, hp  17. No - Are you allergic to latex?  18. No - Is there any chance that you may be pregnant?      Health Care Directive:  Patient does not have a Health Care Directive or Living Will: Discussed advance care planning with patient; however, patient declined at this time.[place]scussed [place]    Preoperative Review of :   reviewed - no record of controlled substances prescribed.      Status of Chronic Conditions:  HYPERLIPIDEMIA - Patient has a long history of significant Hyperlipidemia, treated with dietary supplements.  Osteoarthritis--symptoms now primarily in left knee    Review of Systems     Constitutional, neuro, ENT, endocrine, pulmonary, cardiac, gastrointestinal, genitourinary, musculoskeletal, integument and psychiatric systems are negative, except as otherwise noted.    Patient Active Problem List    Diagnosis Date Noted     Non-toxic multinodular goiter 12/10/2015     Priority: Medium     Left knee pain 11/02/2015     Priority: Medium     OA (osteoarthritis) of knee 02/27/2013     Priority: Medium     Colon polyps 12/11/2012     Priority: Medium     Hypercholesterolemia 12/11/2012     Priority: Medium      Past Medical History:   Diagnosis Date     Colon polyps      NO ACTIVE PROBLEMS (aka NONE)      Pneumonia spring  2012     Reflux      Shingles     1 episode     Thyroid disease      Thyroid nodule     had biopsies  many yrs ago and benign - no meds     Past Surgical History:   Procedure Laterality Date     APPENDECTOMY  1962    had jc on fallopian tube and was removed also     ARTHROPLASTY MINIMALLY INVASIVE HIP  2/27/2013    Procedure: ARTHROPLASTY MINIMALLY INVASIVE HIP;  Minimally Invasive Left Two Incision Total  Hip Arthroplasty;  Surgeon: Brennan Covarrubias MD;  Location: [facility] OR     CHOLECYSTECTOMY       CHOLECYSTECTOMY  2009     COLONOSCOPY  , 2008, 11/4/2013    due in 2018 Hx of precancerous polyps, +FHx maternal aunt     COLONOSCOPY N/A 2/11/2019    Procedure: COMBINED COLONOSCOPY, SINGLE OR MULTIPLE BIOPSY/POLYPECTOMY BY BIOPSY;  Surgeon: Mary Ochoa MD;  Location: [facility] OR     HRW TOTAL KNEE REPLACEMT, CRNA Right 7/9/2015     KNEE SURGERY  2003    right meniscus     SOFT TISSUE SURGERY       SOFT TISSUE SURGERY  1982    tumor on back - benign     Z NONSPECIFIC PROCEDURE      ovarian cyst age 22     Z NONSPECIFIC PROCEDURE      lipoma back 1992     Current Outpatient Medications   Medication Sig Dispense Refill     Cholecalciferol (VITAMIN D) 1000 UNITS capsule Take 2 capsules by mouth daily       Multiple Vitamin (MULTI-VITAMIN DAILY PO) Take 6 tablets by mouth daily. Prepackaged vitamin supplement        Nutritional Supplements (NUTRITIONAL SUPPLEMENT PO) Take 4 capsules by mouth daily Osteomatrix  Calcium carbonate, citrate, malate 1,000 mg  Vitamin D3 600 iu       Nutritional Supplements (NUTRITIONAL SUPPLEMENT PO) Take 2 tablets by mouth Cholesterol Reduction Complex       Nutritional Supplements (NUTRITIONAL SUPPLEMENT PO) Take 1 tablet by mouth daily Mindworks       Nutritional Supplements (NUTRITIONAL SUPPLEMENT PO) Take 2 capsules by mouth Omegaguard       Nutritional Supplements (NUTRITIONAL SUPPLEMENT PO) Take 2 tablets by mouth daily Nutriferon  Immune system science       Nutritional  Supplements (NUTRITIONAL SUPPLEMENT PO) Slow aging at cellular level  Liquid supplement         Allergies   Allergen Reactions     Gluten      Ibuprofen Sodium GI Disturbance     St Johns Wort-Ginseng [Harmonex] Hives        Social History     Tobacco Use     Smoking status: Former Smoker     Packs/day: 1.00     Years: 10.00     Pack years: 10.00     Types: Cigarettes     Quit date: 1981     Years since quittin.2     Smokeless tobacco: Never Used     Tobacco comment:    Substance Use Topics     Alcohol use: Yes     Alcohol/week: 2.0 standard drinks     Types: 2 Standard drinks or equivalent per week     Comment:  1/2 glass wine daily     .  History   Drug Use No             .  lexiscan test 22 is negative   NM Lexiscan stress test  Order: [id number]   Status: Final result     Visible to patient: No (scheduled for 2022  2:19 PM)     Next appt: 2022 at 10:00 AM in Family Practice (Americo Edwards MD)     Dx: Abnormal electrocardiogram     0 Result Notes     Details     Reading Physician Reading Date Result Priority   WALE Garland MD  [phone] 2022 Routine   WALE Garland MD  [phone] 2022        Result Text        The nuclear stress test is negative for inducible myocardial ischemia or infarction.     LVEDv 90ml. LVESv 25ml. LV EF 72%.               Objective     .not currently breastfeeding.      Physical Exam    GENERAL APPEARANCE: healthy, alert and no distress     EYES: EOMI, PERRL     HENT: ear canals and TM's normal and nose and mouth without ulcers or lesions     NECK: no adenopathy, no asymmetry, masses, or scars and thyroid normal to palpation     RESP: lungs clear to auscultation - no rales, rhonchi or wheezes     CV: regular rates and rhythm, normal S1 S2, no S3 or S4 and no murmur, click or rub     ABDOMEN:  soft, nontender, no HSM or masses and bowel sounds normal     MS: extremities normal- no gross deformities noted, no evidence of inflammation in  joints, FROM in all extremities.     NEURO: Normal strength and tone, sensory exam grossly normal, mentation intact and speech normal     PSYCH: mentation appears normal. and affect normal/bright     LYMPHATICS: No cervical adenopathy    Recent Labs   Lab Test 01/31/22  1605   HGB 12.0         POTASSIUM 3.9   CR 0.85        Diagnostics:  No labs were ordered during this visit.   No EKG required for low risk surgery (cataract, skin procedure, breast biopsy, etc).    Revised Cardiac Risk Index (RCRI):  The patient has the following serious cardiovascular risks for perioperative complications:   - No serious cardiac risks = 0 points     RCRI Interpretation: 0 points: Class I (very low risk - 0.4% complication rate)           Signed Electronically by: Americo Edwards MD  Copy of this evaluation report is provided to requesting physician.

## 2022-06-12 ENCOUNTER — HEALTH MAINTENANCE LETTER (OUTPATIENT)
Age: 82
End: 2022-06-12

## 2022-06-28 ENCOUNTER — LAB (OUTPATIENT)
Dept: LAB | Facility: CLINIC | Age: 82
End: 2022-06-28
Attending: FAMILY MEDICINE
Payer: COMMERCIAL

## 2022-06-28 ENCOUNTER — OFFICE VISIT (OUTPATIENT)
Dept: FAMILY MEDICINE | Facility: CLINIC | Age: 82
End: 2022-06-28
Attending: FAMILY MEDICINE
Payer: COMMERCIAL

## 2022-06-28 VITALS
DIASTOLIC BLOOD PRESSURE: 70 MMHG | HEART RATE: 58 BPM | SYSTOLIC BLOOD PRESSURE: 132 MMHG | WEIGHT: 178 LBS | BODY MASS INDEX: 32.56 KG/M2

## 2022-06-28 DIAGNOSIS — M17.12 PRIMARY OSTEOARTHRITIS OF LEFT KNEE: ICD-10-CM

## 2022-06-28 DIAGNOSIS — Z01.818 PREOP GENERAL PHYSICAL EXAM: ICD-10-CM

## 2022-06-28 DIAGNOSIS — Z01.818 PREOP GENERAL PHYSICAL EXAM: Primary | ICD-10-CM

## 2022-06-28 DIAGNOSIS — E78.00 HYPERCHOLESTEROLEMIA: ICD-10-CM

## 2022-06-28 DIAGNOSIS — E04.2 NON-TOXIC MULTINODULAR GOITER: ICD-10-CM

## 2022-06-28 LAB
ANION GAP SERPL CALCULATED.3IONS-SCNC: 4 MMOL/L (ref 3–14)
BUN SERPL-MCNC: 24 MG/DL (ref 7–30)
CALCIUM SERPL-MCNC: 9.6 MG/DL (ref 8.5–10.1)
CHLORIDE BLD-SCNC: 108 MMOL/L (ref 94–109)
CO2 SERPL-SCNC: 29 MMOL/L (ref 20–32)
CREAT SERPL-MCNC: 0.8 MG/DL (ref 0.52–1.04)
ERYTHROCYTE [DISTWIDTH] IN BLOOD BY AUTOMATED COUNT: 14.4 % (ref 10–15)
GFR SERPL CREATININE-BSD FRML MDRD: 74 ML/MIN/1.73M2
GLUCOSE BLD-MCNC: 85 MG/DL (ref 70–99)
HCT VFR BLD AUTO: 36.8 % (ref 35–47)
HGB BLD-MCNC: 11.9 G/DL (ref 11.7–15.7)
MCH RBC QN AUTO: 28.7 PG (ref 26.5–33)
MCHC RBC AUTO-ENTMCNC: 32.3 G/DL (ref 31.5–36.5)
MCV RBC AUTO: 89 FL (ref 78–100)
PLATELET # BLD AUTO: 318 10E3/UL (ref 150–450)
POTASSIUM BLD-SCNC: 4.3 MMOL/L (ref 3.4–5.3)
RBC # BLD AUTO: 4.14 10E6/UL (ref 3.8–5.2)
SODIUM SERPL-SCNC: 141 MMOL/L (ref 133–144)
WBC # BLD AUTO: 6.1 10E3/UL (ref 4–11)

## 2022-06-28 PROCEDURE — 82310 ASSAY OF CALCIUM: CPT

## 2022-06-28 PROCEDURE — 85027 COMPLETE CBC AUTOMATED: CPT

## 2022-06-28 PROCEDURE — 99214 OFFICE O/P EST MOD 30 MIN: CPT | Performed by: FAMILY MEDICINE

## 2022-06-28 PROCEDURE — G0463 HOSPITAL OUTPT CLINIC VISIT: HCPCS

## 2022-06-28 PROCEDURE — 36415 COLL VENOUS BLD VENIPUNCTURE: CPT

## 2022-06-28 ASSESSMENT — PAIN SCALES - GENERAL: PAINLEVEL: NO PAIN (0)

## 2022-06-28 NOTE — PATIENT INSTRUCTIONS
Do not take your vitamins and supplements for one week prior to surgery  Preparing for Your Surgery  Getting started  A nurse will call you to review your health history and instructions. They will give you an arrival time based on your scheduled surgery time. Please be ready to share:  Your doctor's clinic name and phone number  Your medical, surgical and anesthesia history  A list of allergies and sensitivities  A list of medicines, including herbal treatments and over-the-counter drugs  Whether the patient has a legal guardian (ask how to send us the papers in advance)  Please tell us if you're pregnant--or if there's any chance you might be pregnant. Some surgeries may injure a fetus (unborn baby), so they require a pregnancy test. Surgeries that are safe for a fetus don't always need a test, and you can choose whether to have one.   If you have a child who's having surgery, please ask for a copy of Preparing for Your Child's Surgery.    Preparing for surgery  Within 30 days of surgery: Have a pre-op exam (sometimes called an H&P, or History and Physical). This can be done at a clinic or pre-operative center.  If you're having a , you may not need this exam. Talk to your care team.  At your pre-op exam, talk to your care team about all medicines you take. If you need to stop any medicines before surgery, ask when to start taking them again.  We do this for your safety. Many medicines can make you bleed too much during surgery. Some change how well surgery (anesthesia) drugs work.  Call your insurance company to let them know you're having surgery. (If you don't have insurance, call [phone].)  Call your clinic if there's any change in your health. This includes signs of a cold or flu (sore throat, runny nose, cough, rash, fever). It also includes a scrape or scratch near the surgery site.  If you have questions on the day of surgery, call your hospital or surgery center.  COVID testing  You may need  to be tested for COVID-19 before having surgery. If so, we will give you instructions.  Eating and drinking guidelines  For your safety: Unless your surgeon tells you otherwise, follow the guidelines below.  Eat and drink as usual until 8 hours before surgery. After that, no food or milk.  Drink clear liquids until 2 hours before surgery. These are liquids you can see through, like water, Gatorade and Propel Water. You may also have black coffee and tea (no cream or milk).  Nothing by mouth within 2 hours of surgery. This includes gum, candy and breath mints.  If you drink alcohol: Stop drinking it the night before surgery.  If your care team tells you to take medicine on the morning of surgery, it's okay to take it with a sip of water.  Preventing infection  Shower or bathe the night before and morning of your surgery. Follow the instructions your clinic gave you. (If no instructions, use regular soap.)  Don't shave or clip hair near your surgery site. We'll remove the hair if needed.  Don't smoke or vape the morning of surgery. You may chew nicotine gum up to 2 hours before surgery. A nicotine patch is okay.  Note: Some surgeries require you to completely quit smoking and nicotine. Check with your surgeon.  Your care team will make every effort to keep you safe from infection. We will:  Clean our hands often with soap and water (or an alcohol-based hand rub).  Clean the skin at your surgery site with a special soap that kills germs.  Give you a special gown to keep you warm. (Cold raises the risk of infection.)  Wear special hair covers, masks, gowns and gloves during surgery.  Give antibiotic medicine, if prescribed. Not all surgeries need antibiotics.  What to bring on the day of surgery  Photo ID and insurance card  Copy of your health care directive, if you have one  Glasses and hearing aides (bring cases)  You can't wear contacts during surgery  Inhaler and eye drops, if you use them (tell us about these when  you arrive)  CPAP machine or breathing device, if you use them  A few personal items, if spending the night  If you have . . .  A pacemaker, ICD (cardiac defibrillator) or other implant: Bring the ID card.  An implanted stimulator: Bring the remote control.  A legal guardian: Bring a copy of the certified (court-stamped) guardianship papers.  Please remove any jewelry, including body piercings. Leave jewelry and other valuables at home.  If you're going home the day of surgery  You must have a responsible adult drive you home. They should stay with you overnight as well.  If you don't have someone to stay with you, and you aren't safe to go home alone, we may keep you overnight. Insurance often won't pay for this.  After surgery  If it's hard to control your pain or you need more pain medicine, please call your surgeon's office.  Questions?   If you have any questions for your care team, list them here: _________________________________________________________________________________________________________________________________________________________________________ ____________________________________ ____________________________________ ____________________________________  For informational purposes only. Not to replace the advice of your health care provider. Copyright   2003, 2019 [facility]. All rights reserved. Clinically reviewed by Lashanda Cast MD. [employer] [id number] - REV 07/21.

## 2022-06-28 NOTE — PROGRESS NOTES
[facility] WOMEN'S [facility] PROFESSIONAL BLDG  3RD FLR,[place] [address]  [address]  [facility] [address]  [facility] [address]  Phone: [phone]  Fax: [phone]  Primary Provider: Amanda Krueger  Pre-op Performing Provider: DUSTIN VILLARREAL    PREOPERATIVE EVALUATION:  Today's date: 6/28/2022    Mirta Pino is a 81 year old female who presents for a preoperative evaluation.    Surgical Information:  Surgery/Procedure: LEFT TOTAL KNEE ARTHROPLASTY  Surgery Location: [facility]  Surgeon: Dr. Brennan Covarrubias  Surgery Date: 7/25/2022  Where patient plans to recover: At home alone - will have a friend check in  Fax number for surgical facility: [phone]    Type of Anesthesia Anticipated: Spinal and general - she is not 100% sure    Assessment & Plan     The proposed surgical procedure is considered INTERMEDIATE risk.    Preop general physical exam  Primary osteoarthritis of left knee  Knee pain failing conservative tx, plans for TKA. Had normal Lexiscan stress test 2/2/2022. No cp, sob, palpitations.   - Basic metabolic panel; Future  - CBC with platelets; Future    Non-toxic multinodular goiter  TSH in 1/2022 normal, not on meds, last US stated no further follow-up needed, and no new sx.     Hypercholesterolemia  Stable, mild.       Medication Instructions:  Patient is on no chronic medications but advised to hold any vitamins/supplements for one week prior to surgery.     RECOMMENDATION:  APPROVAL GIVEN to proceed with proposed procedure, without further diagnostic evaluation.      Subjective     HPI related to upcoming procedure:   Mirta has been having knee pain, bone-on-bone arthritis in the left knee. Dr. Covarrubias did her R knee replacement and L hip. Using topical medications for pain relief.     1. No Have you ever had a heart attack or stroke?  2. No Have you ever had surgery on your heart or blood vessels, such as a stent, coronary (heart) bypass, or surgery on an artery in the  head, neck, heart or legs?  3. No Do you have chest pain when you re physically active?  4. No Do you have a history of heart failure?  5. No Do you currently have a cold, bronchitis or symptoms of other respiratory (head and chest) infections?  6. No Do you have a cough, shortness of breath, or wheezing?  7. No Do you or anyone in your family have a history of blood clots?  8. No Do you or anyone in your family have a serious bleeding problem, such as longlasting bleeding after surgeries or cuts?  9. Yes - anemic in her 20s Have you ever had anemia or been told to take iron pills?  10. No Have you had any abnormal blood loss such as black, tarry or bloody stools, or abnormal vaginal bleeding?  11. No Have you ever had a blood transfusion?  12. Yes  Are you willing to have a blood transfusion if it is medically needed before, during or after your surgery?  13. No Have you or anyone in your family ever had problems with anesthesia (sedation for surgery)?  14. No Do you have sleep apnea, excessive snoring or daytime drowsiness?  15. No Do you have any artificial heart valves or other implanted medical devices, such as a pacemaker, defibrillator or continuous glucose monitor?  16. Yes - R knee and L hip Do you have any artificial joints?  17. No Are you allergic to latex?  18. No Is there any chance that you may be pregnant?      Preoperative Review of :   reviewed - no record of controlled substances prescribed.    Status of Chronic Conditions:  HYPERLIPIDEMIA - Patient has a long history of Hyperlipidemia managed with nutritional supplement. Last LDL mildly elevated at 128 in 1/2022.     Recent Labs   Lab Test 01/31/22  1605 11/05/15  0930   CHOL 201* 164   HDL 59 43*   * 104   TRIG 71 85   CHOLHDLRATIO  --  3.8        MULTINODULAR GOITER - Patient has a longstanding history of goiter. Was on synthroid for a time, now off for years with stable TSH. Patient has been doing well, noting no tremor, insomnia,  hair loss or changes in skin texture. Last US in 2012 was stable and no further follow-up needed. Denies neck pain, difficulty swallowing. Last TSH   Lab Results   Component Value Date    TSH 1.89 01/31/2022          Review of Systems  CONSTITUTIONAL: NEGATIVE for fever, chills, change in weight  INTEGUMENTARY/SKIN: NEGATIVE for worrisome rashes, moles or lesions  EYES: NEGATIVE for vision changes or irritation - recent cataract surgery in the spring, near vision is worse but far vision is better  ENT/MOUTH: NEGATIVE for ear, mouth and throat problems - wears hearing aids bilaterally  RESP: NEGATIVE for significant cough or SOB  CV: NEGATIVE for chest pain, palpitations or peripheral edema  GI: NEGATIVE for nausea, abdominal pain, heartburn, or change in bowel habits  : NEGATIVE for frequency, dysuria, or hematuria - does wake up in the middle of the night to urinate  MUSCULOSKELETAL: NEGATIVE for significant arthralgias or myalgia - does have L knee pain from OA  NEURO: NEGATIVE for weakness, dizziness or paresthesias  ENDOCRINE: NEGATIVE for temperature intolerance, skin/hair changes  HEME: NEGATIVE for bleeding problems  PSYCHIATRIC: NEGATIVE for changes in mood or affect    Patient Active Problem List    Diagnosis Date Noted     Non-toxic multinodular goiter 12/10/2015     Priority: Medium     Went to endocrinology for several years, had FNA and benign.   Used to be on Synthroid.     Thyroid US 2012:   Impression:   1. Multinodular goiter in the right thyroid lobe. No followup   necessary.   2. Hypoplastic left thyroid lobe.     Component      Latest Ref Rng & Units 1/31/2022   TSH      0.40 - 4.00 mU/L 1.89          Left knee pain 11/02/2015     Priority: Medium     OA (osteoarthritis) of knee 02/27/2013     Priority: Medium     Colon polyps 12/11/2012     Priority: Medium     Hypercholesterolemia 12/11/2012     Priority: Medium     Recent Labs   Lab Test 01/31/22  1605 11/05/15  0930   CHOL 201* 164   HDL 59  43*   * 104   TRIG 71 85   CHOLHDLRATIO  --  3.8             Past Medical History:   Diagnosis Date     Colon polyps      OA (osteoarthritis) of knee 2/27/2013     Pneumonia spring 2012     Reflux      Shingles     1 episode     Thyroid disease      Thyroid nodule     had biopsies  many yrs ago and benign - no meds     Past Surgical History:   Procedure Laterality Date     APPENDECTOMY  01/01/1962    had jc on fallopian tube and was removed also     ARTHROPLASTY MINIMALLY INVASIVE HIP  02/27/2013    Procedure: ARTHROPLASTY MINIMALLY INVASIVE HIP;  Minimally Invasive Left Two Incision Total  Hip Arthroplasty;  Surgeon: Brennan Covarrubias MD;  Location: [facility] OR     CATARACT EXTRACTION       CHOLECYSTECTOMY  01/01/2009     COLONOSCOPY  , 2008, 11/4/2013    due in 2018 Hx of precancerous polyps, +FHx maternal aunt     COLONOSCOPY N/A 02/11/2019    Procedure: COMBINED COLONOSCOPY, SINGLE OR MULTIPLE BIOPSY/POLYPECTOMY BY BIOPSY;  Surgeon: Mary Ochoa MD;  Location: [facility] OR     HRW TOTAL KNEE REPLACEMT, CRNA Right 07/09/2015     KNEE SURGERY  01/01/2003    right meniscus     SOFT TISSUE SURGERY       SOFT TISSUE SURGERY  01/01/1982    tumor on back - benign     [facility] NONSPECIFIC PROCEDURE      ovarian cyst age 22     [facility] NONSPECIFIC PROCEDURE      lipoma back 1992     Current Outpatient Medications   Medication Sig Dispense Refill     Cholecalciferol (VITAMIN D) 1000 UNITS capsule Take 2 capsules by mouth daily       Multiple Vitamin (MULTI-VITAMIN DAILY PO) Take 6 tablets by mouth daily. Prepackaged vitamin supplement        Nutritional Supplements (NUTRITIONAL SUPPLEMENT PO) Take 4 capsules by mouth daily Osteomatrix  Calcium carbonate, citrate, malate 1,000 mg  Vitamin D3 600 iu       Nutritional Supplements (NUTRITIONAL SUPPLEMENT PO) Take 2 tablets by mouth Cholesterol Reduction Complex       Nutritional Supplements (NUTRITIONAL SUPPLEMENT PO) Take 1 tablet by mouth daily FlyData  Supplements (NUTRITIONAL SUPPLEMENT PO) Take 2 capsules by mouth Omegaguard       Nutritional Supplements (NUTRITIONAL SUPPLEMENT PO) Take 2 tablets by mouth daily Nutriferon  Immune system science       Nutritional Supplements (NUTRITIONAL SUPPLEMENT PO) Slow aging at cellular level  Liquid supplement         Allergies   Allergen Reactions     Gluten      Ibuprofen Sodium GI Disturbance     St Johns Wort-Ginseng [Harmonex] Hives        Social History     Tobacco Use     Smoking status: Former Smoker     Packs/day: 1.00     Years: 10.00     Pack years: 10.00     Types: Cigarettes     Quit date: 1981     Years since quittin.5     Smokeless tobacco: Never Used     Tobacco comment:    Substance Use Topics     Alcohol use: Yes     Alcohol/week: 2.0 standard drinks     Types: 2 Standard drinks or equivalent per week     Comment: /2 glass wine once or twice per week     No FH of problems with anesthesia.         Objective     /70   Pulse 58   Wt 80.7 kg (178 lb)   Breastfeeding No   BMI 32.56 kg/m      Physical Exam    GENERAL APPEARANCE: healthy, alert and no distress     EYES: EOMI, PERRL     HENT: ear canals and TM's normal and nose and mouth without ulcers or lesions     NECK: no adenopathy, no asymmetry, masses, or scars and thyroid normal to palpation     RESP: lungs clear to auscultation - no rales, rhonchi or wheezes     CV: regular rates and rhythm, normal S1 S2, no S3 or S4 and no murmur, click or rub     ABDOMEN:  soft, nontender, no HSM or masses and bowel sounds normal     MS: extremities normal- no gross deformities noted, no evidence of inflammation in joints, FROM in all extremities.     SKIN: no suspicious lesions or rashes     NEURO: Normal strength and tone, sensory exam grossly normal, mentation intact and speech normal     PSYCH: mentation appears normal. and affect normal/bright     LYMPHATICS: No cervical adenopathy    Recent Labs   Lab Test 22  1605   HGB 12.0   PLT  329      POTASSIUM 3.9   CR 0.85        Diagnostics:  Component      Latest Ref Rng & Units 6/28/2022   WBC      4.0 - 11.0 10e3/uL 6.1   RBC Count      3.80 - 5.20 10e6/uL 4.14   Hemoglobin      11.7 - 15.7 g/dL 11.9   Hematocrit      35.0 - 47.0 % 36.8   MCV      78 - 100 fL 89   MCH      26.5 - 33.0 pg 28.7   MCHC      31.5 - 36.5 g/dL 32.3   RDW      10.0 - 15.0 % 14.4   Platelet Count      150 - 450 10e3/uL 318     Component      Latest Ref Rng & Units 6/28/2022   Sodium      133 - 144 mmol/L 141   Potassium      3.4 - 5.3 mmol/L 4.3   Chloride      94 - 109 mmol/L 108       Lexiscan Stress test 2/2/2022:   The nuclear stress test is negative for inducible myocardial ischemia or infarction.      Revised Cardiac Risk Index (RCRI):  The patient has the following serious cardiovascular risks for perioperative complications:   - No serious cardiac risks = 0 points     RCRI Interpretation: 0 points: Class I (very low risk - 0.4% complication rate)           Signed Electronically by: Kenyatta Haynes MD  Copy of this evaluation report is provided to requesting physician.

## 2022-10-23 ENCOUNTER — HEALTH MAINTENANCE LETTER (OUTPATIENT)
Age: 82
End: 2022-10-23

## 2022-10-25 ENCOUNTER — OFFICE VISIT (OUTPATIENT)
Dept: FAMILY MEDICINE | Facility: CLINIC | Age: 82
End: 2022-10-25
Attending: FAMILY MEDICINE
Payer: COMMERCIAL

## 2022-10-25 VITALS
SYSTOLIC BLOOD PRESSURE: 131 MMHG | WEIGHT: 174 LBS | HEIGHT: 62 IN | BODY MASS INDEX: 32.02 KG/M2 | HEART RATE: 60 BPM | DIASTOLIC BLOOD PRESSURE: 84 MMHG

## 2022-10-25 DIAGNOSIS — M81.0 AGE-RELATED OSTEOPOROSIS WITHOUT CURRENT PATHOLOGICAL FRACTURE: Primary | ICD-10-CM

## 2022-10-25 DIAGNOSIS — Z78.0 POSTMENOPAUSAL STATUS: ICD-10-CM

## 2022-10-25 DIAGNOSIS — Z86.39 HISTORY OF VITAMIN D DEFICIENCY: ICD-10-CM

## 2022-10-25 DIAGNOSIS — Z12.11 COLON CANCER SCREENING: ICD-10-CM

## 2022-10-25 PROCEDURE — 82306 VITAMIN D 25 HYDROXY: CPT | Performed by: FAMILY MEDICINE

## 2022-10-25 PROCEDURE — 99213 OFFICE O/P EST LOW 20 MIN: CPT | Performed by: FAMILY MEDICINE

## 2022-10-25 PROCEDURE — G0463 HOSPITAL OUTPT CLINIC VISIT: HCPCS

## 2022-10-25 PROCEDURE — 36415 COLL VENOUS BLD VENIPUNCTURE: CPT | Performed by: FAMILY MEDICINE

## 2022-10-25 NOTE — PROGRESS NOTES
"CC: Consult      ASSESSMENT/PLAN:   Mirta was seen today for consult.    Age-related osteoporosis without current pathological fracture  Postmenopausal status  History of vitamin D deficiency  -     25 Hydroxyvitamin D2 and D3; Future  -     Dexa Hip, Pelvis, Spine; Future    Colon cancer screening  Colonoscopy ordered.   -     Colonoscopy Screening  Referral; Future    Worrisome signs and symptoms were discussed with Mirta and she was instructed to return to the clinic for concerning symptoms or to call with questions.      Kenyatta Haynes MD  Family Medicine      SUBJECTIVE: Shad is a 82 year old female with osteoporosis who comes in with the following concerns:    Osteoporosis. Diagnosed by DEXA in 10/2015.   - Most recent DEXA in 11/2016 showed osteoporosis.   - She has not had a fracture.   - Medications: Currently taking >1200 mg of calcium daily and 3000 international units of vitamin D daily.   - Denies thigh or groin pain.   - She is a nonsmoker (quit many years ago). Occasional alcohol use, 1-2 drinks/week.   - Regular exercise but currently not a ton of weight bearing exercise -- currently going to the pool and using stationary bike. In the past, has run with her dog. Plans to resume doing so, was recovering from recent knee surgery.  - Most recent surveillance labs reviewed today and are significant for normal vitamin D level (2015) and stable BMP (2022).   - Had side effects from alendronate and Dr. Haddad recommended Prolia but Shad held off on starting that.     Colon cancer screening. Last colonoscopy in 2/2019 - repeat 3 years. Pt knows she does not need to continue colon cancer screening but elects to continue since she is otherwise in good health.       OBJECTIVE:   /84   Pulse 60   Ht 1.575 m (5' 2\")   Wt 78.9 kg (174 lb)   BMI 31.83 kg/m    Alert and oriented in no acute distress.    "

## 2022-10-25 NOTE — LETTER
November 7, 2022      Shad Pino  [address] [place] [id number]        Dear ,    We are writing to inform you of your test results.    Your test results fall within the expected range(s) or remain unchanged from previous results.  Please continue with current treatment plan.    Resulted Orders   25 Hydroxyvitamin D2 and D3   Result Value Ref Range    25 OH Vitamin D2 <5 ug/L    25 OH Vitamin D3 49 ug/L    25 OH Vit D Total <54 20 - 75 ug/L      Comment:      Season, race, dietary intake, and treatment affect the concentration of 25-hydroxy-Vitamin D. Values may decrease during winter months and increase during summer months. Values 20-29 ug/L may indicate Vitamin D insufficiency and values <20 ug/L may indicate Vitamin D deficiency.    Narrative    This test was developed and its performance characteristics determined by the [facility],  Special Chemistry Laboratory. It has not been cleared or approved by the FDA. The laboratory is regulated under CLIA as qualified to perform high-complexity testing. This test is used for clinical purposes. It should not be regarded as investigational or for research.       If you have any questions or concerns, please call the clinic at the number listed above.       Sincerely,      Kenyatta Haynes MD

## 2022-10-25 NOTE — LETTER
November 7, 2022      Shad Pino  [address] [place] [id number]        Dear ,    We are writing to inform you of your test results.    {results letter list:[id number]}    Resulted Orders   25 Hydroxyvitamin D2 and D3   Result Value Ref Range    25 OH Vitamin D2 <5 ug/L    25 OH Vitamin D3 49 ug/L    25 OH Vit D Total <54 20 - 75 ug/L      Comment:      Season, race, dietary intake, and treatment affect the concentration of 25-hydroxy-Vitamin D. Values may decrease during winter months and increase during summer months. Values 20-29 ug/L may indicate Vitamin D insufficiency and values <20 ug/L may indicate Vitamin D deficiency.    Narrative    This test was developed and its performance characteristics determined by the [facility],  Special Chemistry Laboratory. It has not been cleared or approved by the FDA. The laboratory is regulated under CLIA as qualified to perform high-complexity testing. This test is used for clinical purposes. It should not be regarded as investigational or for research.       If you have any questions or concerns, please call the clinic at the number listed above.       Sincerely,      Kenyatta Haynes MD

## 2022-10-31 LAB
DEPRECATED CALCIDIOL+CALCIFEROL SERPL-MC: <54 UG/L (ref 20–75)
VITAMIN D2 SERPL-MCNC: <5 UG/L
VITAMIN D3 SERPL-MCNC: 49 UG/L

## 2022-11-17 ENCOUNTER — TELEPHONE (OUTPATIENT)
Dept: GASTROENTEROLOGY | Facility: CLINIC | Age: 82
End: 2022-11-17

## 2022-11-17 NOTE — TELEPHONE ENCOUNTER
Screening Questions  BLUE  KIND OF PREP RED  LOCATION [review exclusion criteria] GREEN  SEDATION TYPE        y Are you active on mychart?       Kenyatta Haynes MD Ordering/Referring Provider?        [facility] What type of coverage do you have?      n Have you had a positive covid test in the last 90 days?     31.1 1. BMI  [BMI 40+ - review exclusion criteria]    y  2. Are you able to give consent for your medical care? [IF NO,RN REVIEW]        n  3. Are you taking any prescription pain medications on a routine schedule?        3a. EXTENDED PREP What kind of prescription?     n 4. Do you have any chemical dependencies such as alcohol, street drugs, or methadone?    n 5. Do you have any history of post-traumatic stress syndrome, severe anxiety or history of psychosis?      **If yes 3- 5 , please schedule with MAC sedation.**          IF YES TO ANY 6 - 10 - HOSPITAL SETTING ONLY.     n 6.   Do you need assistance transferring?     n 7.   Have you had a heart or lung transplant?    n 8.   Are you currently on dialysis?   n 9.   Do you use daily home oxygen?   n 10. Do you take nitroglycerin?   10a.  If yes, how often?     11. [FEMALES]  n Are you currently pregnant?    11a.  If yes, how many weeks? [ Greater than 12 weeks, OR NEEDED]    n 12. Do you have Pulmonary Hypertension? *NEED PAC APPT AT [facility]*     n 13. [review exclusion criteria]  Do you have any implantable devices in your body (pacemaker, defib, LVAD)?    n 14. In the past 6 months, have you had any heart related issues including cardiomyopathy or heart attack?     14a.  If yes, did it require cardiac stenting if so when?     n 15. Have you had a stroke or Transient ischemic attack (TIA - aka  mini stroke ) within 6 months?      n 16. Do you have mod to severe Obstructive Sleep Apnea?  [Hospital only - Ok at [place]]    n 17. Do you have SEVERE AND UNCONTROLLED asthma? *NEED PAC APPT AT [facility]*     n 18. Are you currently taking any blood thinners?     18a.  "If yes, inform patient to \"follow up w/ ordering provider for bridging instructions.\"    n 19. Do you take the medication Phentermine?    19a. If yes, \"Hold for 7 days before procedure.  Please consult your prescribing provider if you have questions about holding this medication.\"     n  20. Do you have chronic kidney disease?      n  21. Do you have a diagnosis of diabetes?     n  22. On a regular basis do you go 3-5 days between bowel movements?      23. Preferred LOCAL Pharmacy for Pre Prescription    [ LIST ONLY ONE PHARMACY]          [id number] - [place], [place] - [address]        - CLOSING REMINDERS -    Informed patient they will need an adult    Cannot take any type of public or medical transportation alone    Conscious Sedation- Needs  for 6 hours after the procedure       MAC/General-Needs  for 24 hours after procedure    Pre-Procedure Covid test to be completed [[facility] PCR Testing Required]    Confirmed Nurse will call to complete assessment       - SCHEDULING DETAILS -       Surgeon      Date of Procedure    Type of Procedure Scheduled  Location   Which Colonoscopy Prep was Sent?     Sedation Type      PAC / Pre-op Required         Additional comments:  She is calling [facility] to try at [place]. Will call back if she wants to complete at [place]          "

## 2022-12-06 ENCOUNTER — HOSPITAL ENCOUNTER (OUTPATIENT)
Facility: CLINIC | Age: 82
End: 2022-12-06
Attending: INTERNAL MEDICINE | Admitting: INTERNAL MEDICINE
Payer: COMMERCIAL

## 2023-03-10 ENCOUNTER — OFFICE VISIT (OUTPATIENT)
Dept: FAMILY MEDICINE | Facility: CLINIC | Age: 83
End: 2023-03-10
Attending: FAMILY MEDICINE
Payer: COMMERCIAL

## 2023-03-10 VITALS
HEART RATE: 60 BPM | SYSTOLIC BLOOD PRESSURE: 135 MMHG | BODY MASS INDEX: 32.94 KG/M2 | WEIGHT: 179 LBS | HEIGHT: 62 IN | DIASTOLIC BLOOD PRESSURE: 82 MMHG

## 2023-03-10 DIAGNOSIS — Z86.0100 HISTORY OF COLONIC POLYPS: ICD-10-CM

## 2023-03-10 DIAGNOSIS — Z01.818 PRE-OP EXAM: Primary | ICD-10-CM

## 2023-03-10 PROCEDURE — G0463 HOSPITAL OUTPT CLINIC VISIT: HCPCS | Performed by: FAMILY MEDICINE

## 2023-03-10 PROCEDURE — 99213 OFFICE O/P EST LOW 20 MIN: CPT | Performed by: FAMILY MEDICINE

## 2023-03-10 ASSESSMENT — ANXIETY QUESTIONNAIRES
GAD7 TOTAL SCORE: 0
GAD7 TOTAL SCORE: 0
2. NOT BEING ABLE TO STOP OR CONTROL WORRYING: NOT AT ALL
IF YOU CHECKED OFF ANY PROBLEMS ON THIS QUESTIONNAIRE, HOW DIFFICULT HAVE THESE PROBLEMS MADE IT FOR YOU TO DO YOUR WORK, TAKE CARE OF THINGS AT HOME, OR GET ALONG WITH OTHER PEOPLE: NOT DIFFICULT AT ALL
1. FEELING NERVOUS, ANXIOUS, OR ON EDGE: NOT AT ALL
3. WORRYING TOO MUCH ABOUT DIFFERENT THINGS: NOT AT ALL
6. BECOMING EASILY ANNOYED OR IRRITABLE: NOT AT ALL
7. FEELING AFRAID AS IF SOMETHING AWFUL MIGHT HAPPEN: NOT AT ALL
5. BEING SO RESTLESS THAT IT IS HARD TO SIT STILL: NOT AT ALL

## 2023-03-10 ASSESSMENT — PAIN SCALES - GENERAL: PAINLEVEL: NO PAIN (0)

## 2023-03-10 ASSESSMENT — PATIENT HEALTH QUESTIONNAIRE - PHQ9
5. POOR APPETITE OR OVEREATING: NOT AT ALL
SUM OF ALL RESPONSES TO PHQ QUESTIONS 1-9: 0

## 2023-03-10 NOTE — H&P (VIEW-ONLY)
[facility] PROFESSIONAL BLDG  3RD FLR,[place] [address]  [address]  [facility] [address]  [facility] [address]  Phone: [phone]  Fax: [phone]  Primary Provider: Amanda Krueger  Pre-op Performing Provider: DUSTIN VILLARREAL    PREOPERATIVE EVALUATION:  Today's date: 3/10/2023    Mirta Pino is a 82 year old female who presents for a preoperative evaluation.    Surgical Information:  Surgery/Procedure: Colonoscopy  Surgery Location: [facility]  Surgeon: [facility]  Surgery Date: 3/20/23  Time of Surgery: 0930  Where patient plans to recover: At home alone, has friend for transportation  Fax number for surgical facility: Note does not need to be faxed, will be available electronically in Epic.    Type of Anesthesia Anticipated: Moderate Sedation    Assessment & Plan     The proposed surgical procedure is considered LOW risk.      History of colonic polyps  Preop general physical exam  Having colonoscopy      Risks and Recommendations:  The patient has the following additional risks and recommendations for perioperative complications:   - No identified additional risk factors other than previously addressed    Medication Instructions:  Patient is to take all scheduled medications on the day of surgery EXCEPT for modifications listed below:  hold vitamins and supplements    RECOMMENDATION:  APPROVAL GIVEN to proceed with proposed procedure, without further diagnostic evaluation.    Subjective     HPI related to upcoming procedure: Last colonoscopy 2/2019, repeat Q3yrs. Knows she does not need to continue colon cancer screening, elects to continue since otherwise in good health     1. What is your level of physical activity? Walk 15-30 min 3 times/week, 10 min swimming weekly , bicycle 3x/wk  2. Do you have chest pain when you re physically active? No  3. Do you currently have a cold, bronchitis or symptoms of other respiratory (head and chest) infections? No  4. Do you have a  "cough, shortness of breath, or wheezing? Some coughing/sneezing r/t allergies    5. Have you ever had anemia or been told to take iron pills? Yes - \"most of the time I'm borderline\" - last Hgb level in 6/2022 was normal at 11.9  6. Have you had any abnormal blood loss such as black, tarry or bloody stools, or abnormal vaginal bleeding? No  7. Have you ever had a blood transfusion? No  8. Are you willing to have a blood transfusion if it is medically needed before, during or after your surgery? Yes  9. Have you ever had a heart attack or stroke? No  10. Have you ever had surgery on your heart or blood vessels, such as a stent, coronary (heart) bypass, or surgery on an artery in the head, neck, heart or legs? No  11. Do you have a history of heart failure? No  12. Do you have sleep apnea, excessive snoring or daytime drowsiness? No    13. Do you or anyone in your family have a history of blood clots? No  14. Do you or anyone in your family have a serious bleeding problem, such as longlasting bleeding after surgeries or cuts? No  15. Have you or anyone in your family ever had problems with anesthesia (sedation for surgery)? No    16. Do you have any artificial heart valves or other implanted medical devices, such as a pacemaker, defibrillator or continuous glucose monitor? No  17. Do you have any artificial joints? Bilateral Knees, left hip  18. Are you allergic to latex? No  19. Is there any chance that you may be pregnant? No    Preoperative Review of :   reviewed - no record of controlled substances prescribed.    Status of Chronic Conditions:  See problem list for active medical problems.  Problems all longstanding and stable, except as noted/documented.  See ROS for pertinent symptoms related to these conditions.      Review of Systems   CONSTITUTIONAL: NEGATIVE for fever, chills, change in weight  ENT/MOUTH: NEGATIVE for ear, mouth and throat problems except esophageal irration from reflux  RESP: NEGATIVE " for significant cough or SOB  CV: NEGATIVE for chest pain, palpitations or peripheral edema    Patient Active Problem List    Diagnosis Date Noted     Non-toxic multinodular goiter 12/10/2015     Priority: Medium     Went to endocrinology for several years, had FNA and benign.   Used to be on Synthroid.     Thyroid US 2012:   Impression:   1. Multinodular goiter in the right thyroid lobe. No followup   necessary.   2. Hypoplastic left thyroid lobe.     Component      Latest Ref Rng & Units 1/31/2022   TSH      0.40 - 4.00 mU/L 1.89          Colon polyps 12/11/2012     Priority: Medium     Hypercholesterolemia 12/11/2012     Priority: Medium     Recent Labs   Lab Test 01/31/22  1605 11/05/15  0930   CHOL 201* 164   HDL 59 43*   * 104   TRIG 71 85   CHOLHDLRATIO  --  3.8             Past Medical History:   Diagnosis Date     Colon polyps      OA (osteoarthritis) of knee 02/27/2013     Pneumonia spring 2012     Reflux      Shingles     1 episode     Thyroid disease      Thyroid nodule     had biopsies  many yrs ago and benign - no meds     Past Surgical History:   Procedure Laterality Date     APPENDECTOMY  01/01/1962    had jc on fallopian tube and was removed also     ARTHROPLASTY MINIMALLY INVASIVE HIP  02/27/2013    Procedure: ARTHROPLASTY MINIMALLY INVASIVE HIP;  Minimally Invasive Left Two Incision Total  Hip Arthroplasty;  Surgeon: Brennan Covarrubias MD;  Location: [facility] OR     CATARACT EXTRACTION       CHOLECYSTECTOMY  01/01/2009     COLONOSCOPY  , 2008, 11/4/2013    due in 2018 Hx of precancerous polyps, +FHx maternal aunt     COLONOSCOPY N/A 02/11/2019    Procedure: COMBINED COLONOSCOPY, SINGLE OR MULTIPLE BIOPSY/POLYPECTOMY BY BIOPSY;  Surgeon: Mary Ochoa MD;  Location:  OR     HRW TOTAL KNEE REPLACEMT, CRNA Right 07/09/2015     KNEE SURGERY  01/01/2003    right meniscus     REPLACEMENT TOTAL KNEE Left 07/2022     SOFT TISSUE SURGERY       SOFT TISSUE SURGERY  01/01/1982    tumor on back -  "benign     ZZC NONSPECIFIC PROCEDURE      ovarian cyst age 22     ZZC NONSPECIFIC PROCEDURE      lipoma back      Current Outpatient Medications   Medication Sig Dispense Refill     Cholecalciferol (VITAMIN D) 1000 UNITS capsule Take 2 capsules by mouth daily       Multiple Vitamin (MULTI-VITAMIN DAILY PO) Take 6 tablets by mouth daily. Prepackaged vitamin supplement        Nutritional Supplements (NUTRITIONAL SUPPLEMENT PO) Take 4 capsules by mouth daily Osteomatrix  Calcium carbonate, citrate, malate 1,000 mg  Vitamin D3 600 iu       Nutritional Supplements (NUTRITIONAL SUPPLEMENT PO) Take 2 tablets by mouth Cholesterol Reduction Complex       Nutritional Supplements (NUTRITIONAL SUPPLEMENT PO) Take 1 tablet by mouth daily Mindworks       Nutritional Supplements (NUTRITIONAL SUPPLEMENT PO) Take 2 capsules by mouth Omegaguard       Nutritional Supplements (NUTRITIONAL SUPPLEMENT PO) Take 2 tablets by mouth daily Nutriferon  Immune system science       Nutritional Supplements (NUTRITIONAL SUPPLEMENT PO) Slow aging at cellular level  Liquid supplement         Allergies   Allergen Reactions     Gluten      Ibuprofen Sodium GI Disturbance     No Clinical Screening - See Comments Hives     Harmonex     St Johns Wort-Ginseng [Harmonex] Hives        Social History     Tobacco Use     Smoking status: Former     Packs/day: 1.00     Years: 10.00     Pack years: 10.00     Types: Cigarettes     Quit date: 1981     Years since quittin.2     Smokeless tobacco: Never     Tobacco comments:        Substance Use Topics     Alcohol use: Yes     Alcohol/week: 2.0 standard drinks     Types: 2 Standard drinks or equivalent per week     Comment: 1/2 glass wine once or twice per week         Objective     /82   Pulse 60   Ht 1.575 m (5' 2\")   Wt 81.2 kg (179 lb)   BMI 32.74 kg/m      Physical Exam  GENERAL APPEARANCE: healthy, alert and no distress  HENT: ear canals and TM's normal and nose and mouth without " ulcers or lesions  RESP: lungs clear to auscultation - no rales, rhonchi or wheezes  CV: regular rate and rhythm, normal S1 S2, no S3 or S4 and no murmur  ABDOMEN: soft, nontender, no HSM or masses and bowel sounds normal  NEURO: Normal strength and tone, sensory exam grossly normal, mentation intact and speech normal    Recent Labs   Lab Test 06/28/22  1357 01/31/22  1605   HGB 11.9 12.0    329    138   POTASSIUM 4.3 3.9   CR 0.80 0.85        Diagnostics:  No labs were ordered during this visit.   No EKG required for low risk surgery (cataract, skin procedure, breast biopsy, etc).    Revised Cardiac Risk Index (RCRI):  The patient has the following serious cardiovascular risks for perioperative complications:   - No serious cardiac risks = 0 points     RCRI Interpretation: 0 points: Class I (very low risk - 0.4% complication rate)    Patient was seen with student, ANDRES Tom who was present for learning. I personally assessed, examined and made clinical decisions reflected in the documentation.        Signed Electronically by: Kenyatta Haynes MD  Copy of this evaluation report is provided to requesting physician.

## 2023-03-10 NOTE — PROGRESS NOTES
Chief Complaint   Patient presents with     Pre Op Exam     Colonoscopy 3/2023   Odalis Barger LPN

## 2023-03-10 NOTE — PROGRESS NOTES
[facility] PROFESSIONAL BLDG  3RD FLR,[place] [address]  [address]  [facility] [address]  [facility] [address]  Phone: [phone]  Fax: [phone]  Primary Provider: Amanda Krueger  Pre-op Performing Provider: DUSTIN VILLARREAL    PREOPERATIVE EVALUATION:  Today's date: 3/10/2023    Mirta Pino is a 82 year old female who presents for a preoperative evaluation.    Surgical Information:  Surgery/Procedure: Colonoscopy  Surgery Location: [facility]  Surgeon: [facility]  Surgery Date: 3/20/23  Time of Surgery: 0930  Where patient plans to recover: At home alone, has friend for transportation  Fax number for surgical facility: Note does not need to be faxed, will be available electronically in Epic.    Type of Anesthesia Anticipated: Moderate Sedation    Assessment & Plan     The proposed surgical procedure is considered LOW risk.      History of colonic polyps  Preop general physical exam  Having colonoscopy      Risks and Recommendations:  The patient has the following additional risks and recommendations for perioperative complications:   - No identified additional risk factors other than previously addressed    Medication Instructions:  Patient is to take all scheduled medications on the day of surgery EXCEPT for modifications listed below:  hold vitamins and supplements    RECOMMENDATION:  APPROVAL GIVEN to proceed with proposed procedure, without further diagnostic evaluation.    Subjective     HPI related to upcoming procedure: Last colonoscopy 2/2019, repeat Q3yrs. Knows she does not need to continue colon cancer screening, elects to continue since otherwise in good health     1. What is your level of physical activity? Walk 15-30 min 3 times/week, 10 min swimming weekly , bicycle 3x/wk  2. Do you have chest pain when you re physically active? No  3. Do you currently have a cold, bronchitis or symptoms of other respiratory (head and chest) infections? No  4. Do you have a  "cough, shortness of breath, or wheezing? Some coughing/sneezing r/t allergies    5. Have you ever had anemia or been told to take iron pills? Yes - \"most of the time I'm borderline\" - last Hgb level in 6/2022 was normal at 11.9  6. Have you had any abnormal blood loss such as black, tarry or bloody stools, or abnormal vaginal bleeding? No  7. Have you ever had a blood transfusion? No  8. Are you willing to have a blood transfusion if it is medically needed before, during or after your surgery? Yes  9. Have you ever had a heart attack or stroke? No  10. Have you ever had surgery on your heart or blood vessels, such as a stent, coronary (heart) bypass, or surgery on an artery in the head, neck, heart or legs? No  11. Do you have a history of heart failure? No  12. Do you have sleep apnea, excessive snoring or daytime drowsiness? No    13. Do you or anyone in your family have a history of blood clots? No  14. Do you or anyone in your family have a serious bleeding problem, such as longlasting bleeding after surgeries or cuts? No  15. Have you or anyone in your family ever had problems with anesthesia (sedation for surgery)? No    16. Do you have any artificial heart valves or other implanted medical devices, such as a pacemaker, defibrillator or continuous glucose monitor? No  17. Do you have any artificial joints? Bilateral Knees, left hip  18. Are you allergic to latex? No  19. Is there any chance that you may be pregnant? No    Preoperative Review of :   reviewed - no record of controlled substances prescribed.    Status of Chronic Conditions:  See problem list for active medical problems.  Problems all longstanding and stable, except as noted/documented.  See ROS for pertinent symptoms related to these conditions.      Review of Systems   CONSTITUTIONAL: NEGATIVE for fever, chills, change in weight  ENT/MOUTH: NEGATIVE for ear, mouth and throat problems except esophageal irration from reflux  RESP: NEGATIVE " for significant cough or SOB  CV: NEGATIVE for chest pain, palpitations or peripheral edema    Patient Active Problem List    Diagnosis Date Noted     Non-toxic multinodular goiter 12/10/2015     Priority: Medium     Went to endocrinology for several years, had FNA and benign.   Used to be on Synthroid.     Thyroid US 2012:   Impression:   1. Multinodular goiter in the right thyroid lobe. No followup   necessary.   2. Hypoplastic left thyroid lobe.     Component      Latest Ref Rng & Units 1/31/2022   TSH      0.40 - 4.00 mU/L 1.89          Colon polyps 12/11/2012     Priority: Medium     Hypercholesterolemia 12/11/2012     Priority: Medium     Recent Labs   Lab Test 01/31/22  1605 11/05/15  0930   CHOL 201* 164   HDL 59 43*   * 104   TRIG 71 85   CHOLHDLRATIO  --  3.8             Past Medical History:   Diagnosis Date     Colon polyps      OA (osteoarthritis) of knee 02/27/2013     Pneumonia spring 2012     Reflux      Shingles     1 episode     Thyroid disease      Thyroid nodule     had biopsies  many yrs ago and benign - no meds     Past Surgical History:   Procedure Laterality Date     APPENDECTOMY  01/01/1962    had jc on fallopian tube and was removed also     ARTHROPLASTY MINIMALLY INVASIVE HIP  02/27/2013    Procedure: ARTHROPLASTY MINIMALLY INVASIVE HIP;  Minimally Invasive Left Two Incision Total  Hip Arthroplasty;  Surgeon: Brennan Covarrubias MD;  Location: [facility]     CATARACT EXTRACTION       CHOLECYSTECTOMY  01/01/2009     COLONOSCOPY  , 2008, 11/4/2013    due in 2018 Hx of precancerous polyps, +FHx maternal aunt     COLONOSCOPY N/A 02/11/2019    Procedure: COMBINED COLONOSCOPY, SINGLE OR MULTIPLE BIOPSY/POLYPECTOMY BY BIOPSY;  Surgeon: Mary Ochoa MD;  Location:  OR     HRW TOTAL KNEE REPLACEMT, CRNA Right 07/09/2015     KNEE SURGERY  01/01/2003    right meniscus     REPLACEMENT TOTAL KNEE Left 07/2022     SOFT TISSUE SURGERY       SOFT TISSUE SURGERY  01/01/1982    tumor on back -  "benign     ZZC NONSPECIFIC PROCEDURE      ovarian cyst age 22     ZZC NONSPECIFIC PROCEDURE      lipoma back      Current Outpatient Medications   Medication Sig Dispense Refill     Cholecalciferol (VITAMIN D) 1000 UNITS capsule Take 2 capsules by mouth daily       Multiple Vitamin (MULTI-VITAMIN DAILY PO) Take 6 tablets by mouth daily. Prepackaged vitamin supplement        Nutritional Supplements (NUTRITIONAL SUPPLEMENT PO) Take 4 capsules by mouth daily Osteomatrix  Calcium carbonate, citrate, malate 1,000 mg  Vitamin D3 600 iu       Nutritional Supplements (NUTRITIONAL SUPPLEMENT PO) Take 2 tablets by mouth Cholesterol Reduction Complex       Nutritional Supplements (NUTRITIONAL SUPPLEMENT PO) Take 1 tablet by mouth daily Mindworks       Nutritional Supplements (NUTRITIONAL SUPPLEMENT PO) Take 2 capsules by mouth Omegaguard       Nutritional Supplements (NUTRITIONAL SUPPLEMENT PO) Take 2 tablets by mouth daily Nutriferon  Immune system science       Nutritional Supplements (NUTRITIONAL SUPPLEMENT PO) Slow aging at cellular level  Liquid supplement         Allergies   Allergen Reactions     Gluten      Ibuprofen Sodium GI Disturbance     No Clinical Screening - See Comments Hives     Harmonex     St Johns Wort-Ginseng [Harmonex] Hives        Social History     Tobacco Use     Smoking status: Former     Packs/day: 1.00     Years: 10.00     Pack years: 10.00     Types: Cigarettes     Quit date: 1981     Years since quittin.2     Smokeless tobacco: Never     Tobacco comments:        Substance Use Topics     Alcohol use: Yes     Alcohol/week: 2.0 standard drinks     Types: 2 Standard drinks or equivalent per week     Comment: 1/2 glass wine once or twice per week         Objective     /82   Pulse 60   Ht 1.575 m (5' 2\")   Wt 81.2 kg (179 lb)   BMI 32.74 kg/m      Physical Exam  GENERAL APPEARANCE: healthy, alert and no distress  HENT: ear canals and TM's normal and nose and mouth without " ulcers or lesions  RESP: lungs clear to auscultation - no rales, rhonchi or wheezes  CV: regular rate and rhythm, normal S1 S2, no S3 or S4 and no murmur  ABDOMEN: soft, nontender, no HSM or masses and bowel sounds normal  NEURO: Normal strength and tone, sensory exam grossly normal, mentation intact and speech normal    Recent Labs   Lab Test 06/28/22  1357 01/31/22  1605   HGB 11.9 12.0    329    138   POTASSIUM 4.3 3.9   CR 0.80 0.85        Diagnostics:  No labs were ordered during this visit.   No EKG required for low risk surgery (cataract, skin procedure, breast biopsy, etc).    Revised Cardiac Risk Index (RCRI):  The patient has the following serious cardiovascular risks for perioperative complications:   - No serious cardiac risks = 0 points     RCRI Interpretation: 0 points: Class I (very low risk - 0.4% complication rate)    Patient was seen with student, ANDRES Tom who was present for learning. I personally assessed, examined and made clinical decisions reflected in the documentation.        Signed Electronically by: Kenyatta Haynes MD  Copy of this evaluation report is provided to requesting physician.

## 2023-03-20 ENCOUNTER — HOSPITAL ENCOUNTER (OUTPATIENT)
Facility: HOSPITAL | Age: 83
Discharge: HOME | End: 2023-03-20
Attending: INTERNAL MEDICINE | Admitting: INTERNAL MEDICINE
Payer: COMMERCIAL

## 2023-03-20 VITALS
TEMPERATURE: 97.8 F | DIASTOLIC BLOOD PRESSURE: 64 MMHG | HEART RATE: 61 BPM | BODY MASS INDEX: 32.94 KG/M2 | RESPIRATION RATE: 16 BRPM | HEIGHT: 62 IN | OXYGEN SATURATION: 99 % | WEIGHT: 179 LBS | SYSTOLIC BLOOD PRESSURE: 119 MMHG

## 2023-03-20 LAB — COLONOSCOPY: NORMAL

## 2023-03-20 PROCEDURE — 45385 COLONOSCOPY W/LESION REMOVAL: CPT | Mod: PT | Performed by: INTERNAL MEDICINE

## 2023-03-20 PROCEDURE — 250N000011 HC RX IP 250 OP 636: Performed by: INTERNAL MEDICINE

## 2023-03-20 PROCEDURE — 88305 TISSUE EXAM BY PATHOLOGIST: CPT | Mod: TC | Performed by: INTERNAL MEDICINE

## 2023-03-20 PROCEDURE — G0500 MOD SEDAT ENDO SERVICE >5YRS: HCPCS | Mod: PT | Performed by: INTERNAL MEDICINE

## 2023-03-20 PROCEDURE — 45380 COLONOSCOPY AND BIOPSY: CPT | Performed by: INTERNAL MEDICINE

## 2023-03-20 RX ORDER — ATROPINE SULFATE 0.1 MG/ML
1 INJECTION INTRAVENOUS
Status: DISCONTINUED | OUTPATIENT
Start: 2023-03-20 | End: 2023-03-20 | Stop reason: HOSPADM

## 2023-03-20 RX ORDER — DIPHENHYDRAMINE HYDROCHLORIDE 50 MG/ML
25-50 INJECTION INTRAMUSCULAR; INTRAVENOUS
Status: DISCONTINUED | OUTPATIENT
Start: 2023-03-20 | End: 2023-03-20 | Stop reason: HOSPADM

## 2023-03-20 RX ORDER — NALOXONE HYDROCHLORIDE 0.4 MG/ML
0.2 INJECTION, SOLUTION INTRAMUSCULAR; INTRAVENOUS; SUBCUTANEOUS
Status: DISCONTINUED | OUTPATIENT
Start: 2023-03-20 | End: 2023-03-20 | Stop reason: HOSPADM

## 2023-03-20 RX ORDER — FLUMAZENIL 0.1 MG/ML
0.2 INJECTION, SOLUTION INTRAVENOUS
Status: DISCONTINUED | OUTPATIENT
Start: 2023-03-20 | End: 2023-03-20 | Stop reason: HOSPADM

## 2023-03-20 RX ORDER — NALOXONE HYDROCHLORIDE 0.4 MG/ML
0.4 INJECTION, SOLUTION INTRAMUSCULAR; INTRAVENOUS; SUBCUTANEOUS
Status: DISCONTINUED | OUTPATIENT
Start: 2023-03-20 | End: 2023-03-20 | Stop reason: HOSPADM

## 2023-03-20 RX ORDER — FENTANYL CITRATE 50 UG/ML
INJECTION, SOLUTION INTRAMUSCULAR; INTRAVENOUS PRN
Status: DISCONTINUED | OUTPATIENT
Start: 2023-03-20 | End: 2023-03-20 | Stop reason: HOSPADM

## 2023-03-20 RX ORDER — FENTANYL CITRATE 50 UG/ML
50-100 INJECTION, SOLUTION INTRAMUSCULAR; INTRAVENOUS EVERY 5 MIN PRN
Status: DISCONTINUED | OUTPATIENT
Start: 2023-03-20 | End: 2023-03-20 | Stop reason: HOSPADM

## 2023-03-20 RX ORDER — LIDOCAINE 40 MG/G
CREAM TOPICAL
Status: DISCONTINUED | OUTPATIENT
Start: 2023-03-20 | End: 2023-03-20 | Stop reason: HOSPADM

## 2023-03-20 RX ORDER — SIMETHICONE 40MG/0.6ML
133 SUSPENSION, DROPS(FINAL DOSAGE FORM)(ML) ORAL
Status: DISCONTINUED | OUTPATIENT
Start: 2023-03-20 | End: 2023-03-20 | Stop reason: HOSPADM

## 2023-03-20 RX ORDER — EPINEPHRINE 1 MG/ML
0.1 INJECTION, SOLUTION INTRAMUSCULAR; SUBCUTANEOUS
Status: DISCONTINUED | OUTPATIENT
Start: 2023-03-20 | End: 2023-03-20 | Stop reason: HOSPADM

## 2023-03-20 RX ORDER — ONDANSETRON 2 MG/ML
4 INJECTION INTRAMUSCULAR; INTRAVENOUS
Status: DISCONTINUED | OUTPATIENT
Start: 2023-03-20 | End: 2023-03-20 | Stop reason: HOSPADM

## 2023-03-20 ASSESSMENT — ACTIVITIES OF DAILY LIVING (ADL): ADLS_ACUITY_SCORE: 35

## 2023-03-20 NOTE — PRE-PROCEDURE
GENERAL PRE-PROCEDURE:   Procedure:  Colonoscopy  Date/Time:  3/20/2023 9:44 AM    Verbal consent obtained?: Yes    Written consent obtained?: Yes    Risks and benefits: Risks, benefits and alternatives were discussed    Consent given by:  Patient  Patient states understanding of procedure being performed: Yes    Patient's understanding of procedure matches consent: Yes    Procedure consent matches procedure scheduled: Yes    Expected level of sedation:  Moderate  Appropriately NPO:  Yes  ASA Class:  1  Mallampati  :  Grade 1- soft palate, uvula, tonsillar pillars, and posterior pharyngeal wall visible  Lungs:  Lungs clear with good breath sounds bilaterally  Heart:  Normal heart sounds and rate  History & Physical reviewed:  History and physical reviewed and no updates needed  Statement of review:  I have reviewed the lab findings, diagnostic data, medications, and the plan for sedation

## 2023-03-20 NOTE — INTERVAL H&P NOTE
"I have reviewed the surgical (or preoperative) H&P that is linked to this encounter, and examined the patient. There are no significant changes    Clinical Conditions Present on Arrival:  Clinically Significant Risk Factors Present on Admission                    # Obesity: Estimated body mass index is 32.74 kg/m  as calculated from the following:    Height as of this encounter: 1.575 m (5' 2\").    Weight as of this encounter: 81.2 kg (179 lb).       "

## 2023-03-21 LAB
PATH REPORT.COMMENTS IMP SPEC: NORMAL
PATH REPORT.COMMENTS IMP SPEC: NORMAL
PATH REPORT.FINAL DX SPEC: NORMAL
PATH REPORT.GROSS SPEC: NORMAL
PATH REPORT.MICROSCOPIC SPEC OTHER STN: NORMAL
PATH REPORT.RELEVANT HX SPEC: NORMAL
PHOTO IMAGE: NORMAL

## 2023-03-21 PROCEDURE — 88305 TISSUE EXAM BY PATHOLOGIST: CPT | Mod: 26 | Performed by: PATHOLOGY

## 2023-06-24 ENCOUNTER — HEALTH MAINTENANCE LETTER (OUTPATIENT)
Age: 83
End: 2023-06-24

## 2024-08-17 ENCOUNTER — HEALTH MAINTENANCE LETTER (OUTPATIENT)
Age: 84
End: 2024-08-17

## 2025-07-10 ENCOUNTER — HOSPITAL ENCOUNTER (EMERGENCY)
Facility: CLINIC | Age: 85
Discharge: HOME | End: 2025-07-10
Attending: EMERGENCY MEDICINE | Admitting: EMERGENCY MEDICINE
Payer: COMMERCIAL

## 2025-07-10 VITALS
RESPIRATION RATE: 20 BRPM | BODY MASS INDEX: 30.02 KG/M2 | SYSTOLIC BLOOD PRESSURE: 193 MMHG | OXYGEN SATURATION: 98 % | TEMPERATURE: 97.8 F | DIASTOLIC BLOOD PRESSURE: 89 MMHG | WEIGHT: 159 LBS | HEIGHT: 61 IN | HEART RATE: 53 BPM

## 2025-07-10 DIAGNOSIS — T63.481A ALLERGIC REACTION TO INSECT STING, ACCIDENTAL OR UNINTENTIONAL, INITIAL ENCOUNTER: ICD-10-CM

## 2025-07-10 PROCEDURE — 250N000013 HC RX MED GY IP 250 OP 250 PS 637: Performed by: EMERGENCY MEDICINE

## 2025-07-10 PROCEDURE — 250N000012 HC RX MED GY IP 250 OP 636 PS 637: Performed by: EMERGENCY MEDICINE

## 2025-07-10 PROCEDURE — 99284 EMERGENCY DEPT VISIT MOD MDM: CPT | Performed by: EMERGENCY MEDICINE

## 2025-07-10 RX ORDER — PREDNISONE 20 MG/1
40 TABLET ORAL ONCE
Status: COMPLETED | OUTPATIENT
Start: 2025-07-10 | End: 2025-07-10

## 2025-07-10 RX ORDER — DIPHENHYDRAMINE HCL 25 MG
25 CAPSULE ORAL EVERY 6 HOURS PRN
Qty: 20 CAPSULE | Refills: 0 | Status: SHIPPED | OUTPATIENT
Start: 2025-07-10

## 2025-07-10 RX ORDER — FAMOTIDINE 20 MG/1
20 TABLET, FILM COATED ORAL ONCE
Status: COMPLETED | OUTPATIENT
Start: 2025-07-10 | End: 2025-07-10

## 2025-07-10 RX ORDER — EPINEPHRINE 0.3 MG/.3ML
0.3 INJECTION SUBCUTANEOUS
Qty: 2 EACH | Refills: 0 | Status: SHIPPED | OUTPATIENT
Start: 2025-07-10

## 2025-07-10 RX ORDER — DIPHENHYDRAMINE HCL 50 MG
50 CAPSULE ORAL ONCE
Status: COMPLETED | OUTPATIENT
Start: 2025-07-10 | End: 2025-07-10

## 2025-07-10 RX ORDER — PREDNISONE 20 MG/1
TABLET ORAL
Qty: 5 TABLET | Refills: 0 | Status: SHIPPED | OUTPATIENT
Start: 2025-07-10

## 2025-07-10 RX ORDER — FAMOTIDINE 20 MG/1
20 TABLET, FILM COATED ORAL 2 TIMES DAILY
Qty: 14 TABLET | Refills: 0 | Status: SHIPPED | OUTPATIENT
Start: 2025-07-10 | End: 2025-07-17

## 2025-07-10 RX ADMIN — PREDNISONE 40 MG: 20 TABLET ORAL at 03:15

## 2025-07-10 RX ADMIN — FAMOTIDINE 20 MG: 20 TABLET, FILM COATED ORAL at 03:16

## 2025-07-10 RX ADMIN — DIPHENHYDRAMINE HYDROCHLORIDE 50 MG: 50 CAPSULE ORAL at 03:15

## 2025-07-10 ASSESSMENT — ACTIVITIES OF DAILY LIVING (ADL)
ADLS_ACUITY_SCORE: 41
ADLS_ACUITY_SCORE: 41

## 2025-07-10 ASSESSMENT — COLUMBIA-SUICIDE SEVERITY RATING SCALE - C-SSRS
6. HAVE YOU EVER DONE ANYTHING, STARTED TO DO ANYTHING, OR PREPARED TO DO ANYTHING TO END YOUR LIFE?: NO
1. IN THE PAST MONTH, HAVE YOU WISHED YOU WERE DEAD OR WISHED YOU COULD GO TO SLEEP AND NOT WAKE UP?: NO
2. HAVE YOU ACTUALLY HAD ANY THOUGHTS OF KILLING YOURSELF IN THE PAST MONTH?: NO
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
## 2025-07-10 NOTE — ED TRIAGE NOTES
Pt was stung by a wasp around 10 am today and noticed tonight around 2000 pm that her hand started to swell and it became itchy     Triage Assessment (Adult)       Row Name 07/10/25 0242          Triage Assessment    Airway WDL WDL        Respiratory WDL    Respiratory WDL WDL        Skin Circulation/Temperature WDL    Skin Circulation/Temperature WDL WDL        Cardiac WDL    Cardiac WDL WDL        Peripheral/Neurovascular WDL    Peripheral Neurovascular WDL WDL        Cognitive/Neuro/Behavioral WDL    Cognitive/Neuro/Behavioral WDL WDL

## (undated) DEVICE — ENDO CAP AND TUBING STERILE FOR ENDOGATOR  100130

## (undated) DEVICE — CONNECTOR ONE-LINK INJECTION SITE LF 7N8399

## (undated) DEVICE — SNARE OVAL SMALL DISP 100600

## (undated) DEVICE — SWABCAP DISINFECTANT GREEN CFF10-250

## (undated) DEVICE — KIT IV START DYNDV1431

## (undated) DEVICE — CANNULA NASAL COMFORT SOFT 25FT 0537

## (undated) DEVICE — SUCTION CANISTER 1000ML 65651-510

## (undated) DEVICE — SUCTION MANIFOLD NEPTUNE 2 SYS 4 PORT 0702-020-000

## (undated) DEVICE — ENDO FORCEP ENDOJAW BIOPSY 2.8MMX230CM FB-220U

## (undated) DEVICE — SYR 03ML LL W/O NDL 309657

## (undated) DEVICE — PLATE GROUNDING ADULT W/CORD 9165L

## (undated) DEVICE — KIT CONNECTOR FOR OLYMPUS ENDOSCOPES DEFENDO 100310

## (undated) DEVICE — SOL WATER IRRIG 1000ML BOTTLE 2F7114

## (undated) DEVICE — SOL WATER IRRIG 500ML BOTTLE 2F7113

## (undated) DEVICE — TUBING ENDOGATOR + H2O PORT CO

## (undated) DEVICE — KIT SCOPE CLEANING ENDOSCOPY BX00719705

## (undated) DEVICE — SPECIMEN CONTAINER 3OZ W/FORMALIN 59901

## (undated) RX ORDER — REGADENOSON 0.08 MG/ML
INJECTION, SOLUTION INTRAVENOUS
Status: DISPENSED
Start: 2022-02-02

## (undated) RX ORDER — FENTANYL CITRATE 50 UG/ML
INJECTION, SOLUTION INTRAMUSCULAR; INTRAVENOUS
Status: DISPENSED
Start: 2019-02-11